# Patient Record
Sex: FEMALE | Race: WHITE | NOT HISPANIC OR LATINO | Employment: OTHER | URBAN - METROPOLITAN AREA
[De-identification: names, ages, dates, MRNs, and addresses within clinical notes are randomized per-mention and may not be internally consistent; named-entity substitution may affect disease eponyms.]

---

## 2023-09-12 ENCOUNTER — TELEPHONE (OUTPATIENT)
Dept: PRIMARY CARE | Facility: CLINIC | Age: 68
End: 2023-09-12
Payer: MEDICARE

## 2023-09-12 DIAGNOSIS — R92.8 ABNORMAL MAMMOGRAM: Primary | ICD-10-CM

## 2023-09-12 NOTE — TELEPHONE ENCOUNTER
Spoke with Pt  Relayed result and recommendation  Pt verbally understood  Faxing the orders to [facility] on [address].

## 2023-09-25 PROBLEM — Z00.00 ROUTINE ADULT HEALTH MAINTENANCE: Status: ACTIVE | Noted: 2023-09-25

## 2024-01-13 PROBLEM — R32 URINARY INCONTINENCE IN FEMALE: Status: ACTIVE | Noted: 2022-09-08

## 2024-01-13 PROBLEM — D72.819 LEUKOPENIA: Status: ACTIVE | Noted: 2024-01-13

## 2024-01-13 PROBLEM — K64.4 EXTERNAL HEMORRHOIDS: Status: ACTIVE | Noted: 2024-01-13

## 2024-01-13 PROBLEM — D64.9 ANEMIA: Status: ACTIVE | Noted: 2024-01-13

## 2024-01-13 PROBLEM — K76.89 LIVER CYST: Status: ACTIVE | Noted: 2024-01-13

## 2024-01-13 PROBLEM — R03.0 WHITE COAT SYNDROME WITHOUT DIAGNOSIS OF HYPERTENSION: Status: ACTIVE | Noted: 2024-01-13

## 2024-01-13 PROBLEM — E55.9 VITAMIN D DEFICIENCY: Status: ACTIVE | Noted: 2024-01-13

## 2024-01-13 PROBLEM — M16.11 ARTHRITIS OF RIGHT HIP: Status: ACTIVE | Noted: 2024-01-13

## 2024-01-13 PROBLEM — Z71.89 ADVANCED DIRECTIVES, COUNSELING/DISCUSSION: Status: ACTIVE | Noted: 2024-01-13

## 2024-01-13 PROBLEM — Z71.89 CARDIAC RISK COUNSELING: Status: ACTIVE | Noted: 2024-01-13

## 2024-01-13 PROBLEM — Z85.828 HISTORY OF NONMELANOMA SKIN CANCER: Status: ACTIVE | Noted: 2024-01-13

## 2024-01-13 PROBLEM — E80.4 GILBERT SYNDROME: Status: ACTIVE | Noted: 2024-01-13

## 2024-01-13 PROBLEM — M85.9 DISORDER OF BONE DENSITY AND STRUCTURE, UNSPECIFIED: Status: ACTIVE | Noted: 2024-01-13

## 2024-01-13 PROBLEM — Z86.2 HISTORY OF ANEMIA: Status: ACTIVE | Noted: 2024-01-13

## 2024-01-13 PROBLEM — R73.01 IMPAIRED FASTING GLUCOSE: Status: ACTIVE | Noted: 2024-01-13

## 2024-01-13 PROBLEM — N81.6 RECTOCELE, FEMALE: Status: ACTIVE | Noted: 2024-01-13

## 2024-01-13 PROBLEM — R76.8 POSITIVE ANTINUCLEAR ANTIBODY: Status: ACTIVE | Noted: 2024-01-13

## 2024-01-13 PROBLEM — Z13.89 SCREENING FOR MULTIPLE CONDITIONS: Status: ACTIVE | Noted: 2024-01-13

## 2024-01-13 PROBLEM — N95.2 POSTMENOPAUSAL ATROPHIC VAGINITIS: Status: ACTIVE | Noted: 2024-01-13

## 2024-01-13 PROBLEM — Z01.419 ENCOUNTER FOR GYNECOLOGICAL EXAMINATION WITHOUT ABNORMAL FINDING: Status: ACTIVE | Noted: 2024-01-13

## 2024-02-09 ENCOUNTER — LAB (OUTPATIENT)
Dept: LAB | Facility: LAB | Age: 69
End: 2024-02-09
Payer: MEDICARE

## 2024-02-09 DIAGNOSIS — D72.819 DECREASED WHITE BLOOD CELL COUNT, UNSPECIFIED: ICD-10-CM

## 2024-02-09 DIAGNOSIS — E80.4 GILBERT SYNDROME: ICD-10-CM

## 2024-02-09 DIAGNOSIS — E55.9 VITAMIN D DEFICIENCY, UNSPECIFIED: ICD-10-CM

## 2024-02-09 DIAGNOSIS — M85.9 DISORDER OF BONE DENSITY AND STRUCTURE, UNSPECIFIED: Primary | ICD-10-CM

## 2024-02-09 DIAGNOSIS — K76.89 OTHER SPECIFIED DISEASES OF LIVER: ICD-10-CM

## 2024-02-09 LAB
25(OH)D3 SERPL-MCNC: 54 NG/ML (ref 30–100)
ALBUMIN SERPL BCP-MCNC: 4.4 G/DL (ref 3.4–5)
ALP SERPL-CCNC: 58 U/L (ref 33–136)
ALT SERPL W P-5'-P-CCNC: 11 U/L (ref 7–45)
ANION GAP SERPL CALC-SCNC: 12 MMOL/L (ref 10–20)
APPEARANCE UR: ABNORMAL
AST SERPL W P-5'-P-CCNC: 12 U/L (ref 9–39)
BACTERIA #/AREA URNS AUTO: ABNORMAL /HPF
BASOPHILS # BLD AUTO: 0.03 X10*3/UL (ref 0–0.1)
BASOPHILS NFR BLD AUTO: 0.9 %
BILIRUB DIRECT SERPL-MCNC: 0.2 MG/DL (ref 0–0.3)
BILIRUB SERPL-MCNC: 0.9 MG/DL (ref 0–1.2)
BILIRUB UR STRIP.AUTO-MCNC: NEGATIVE MG/DL
BUN SERPL-MCNC: 12 MG/DL (ref 6–23)
CALCIUM SERPL-MCNC: 9.2 MG/DL (ref 8.6–10.3)
CHLORIDE SERPL-SCNC: 104 MMOL/L (ref 98–107)
CHOLEST SERPL-MCNC: 148 MG/DL (ref 0–199)
CHOLESTEROL/HDL RATIO: 2.5
CO2 SERPL-SCNC: 26 MMOL/L (ref 21–32)
COLOR UR: YELLOW
CREAT SERPL-MCNC: 0.69 MG/DL (ref 0.5–1.05)
EGFRCR SERPLBLD CKD-EPI 2021: >90 ML/MIN/1.73M*2
EOSINOPHIL # BLD AUTO: 0.05 X10*3/UL (ref 0–0.7)
EOSINOPHIL NFR BLD AUTO: 1.5 %
ERYTHROCYTE [DISTWIDTH] IN BLOOD BY AUTOMATED COUNT: 11.9 % (ref 11.5–14.5)
GLUCOSE SERPL-MCNC: 110 MG/DL (ref 74–99)
GLUCOSE UR STRIP.AUTO-MCNC: NEGATIVE MG/DL
HCT VFR BLD AUTO: 39.5 % (ref 36–46)
HDLC SERPL-MCNC: 59.1 MG/DL
HGB BLD-MCNC: 13.1 G/DL (ref 12–16)
IMM GRANULOCYTES # BLD AUTO: 0.01 X10*3/UL (ref 0–0.7)
IMM GRANULOCYTES NFR BLD AUTO: 0.3 % (ref 0–0.9)
KETONES UR STRIP.AUTO-MCNC: ABNORMAL MG/DL
LDLC SERPL CALC-MCNC: 76 MG/DL
LEUKOCYTE ESTERASE UR QL STRIP.AUTO: ABNORMAL
LYMPHOCYTES # BLD AUTO: 1.34 X10*3/UL (ref 1.2–4.8)
LYMPHOCYTES NFR BLD AUTO: 41.4 %
MCH RBC QN AUTO: 32.1 PG (ref 26–34)
MCHC RBC AUTO-ENTMCNC: 33.2 G/DL (ref 32–36)
MCV RBC AUTO: 97 FL (ref 80–100)
MONOCYTES # BLD AUTO: 0.36 X10*3/UL (ref 0.1–1)
MONOCYTES NFR BLD AUTO: 11.1 %
MUCOUS THREADS #/AREA URNS AUTO: ABNORMAL /LPF
NEUTROPHILS # BLD AUTO: 1.45 X10*3/UL (ref 1.2–7.7)
NEUTROPHILS NFR BLD AUTO: 44.8 %
NITRITE UR QL STRIP.AUTO: NEGATIVE
NON HDL CHOLESTEROL: 89 MG/DL (ref 0–149)
NRBC BLD-RTO: 0 /100 WBCS (ref 0–0)
PH UR STRIP.AUTO: 6 [PH]
PLATELET # BLD AUTO: 142 X10*3/UL (ref 150–450)
POTASSIUM SERPL-SCNC: 3.8 MMOL/L (ref 3.5–5.3)
PROT SERPL-MCNC: 6.4 G/DL (ref 6.4–8.2)
PROT UR STRIP.AUTO-MCNC: NEGATIVE MG/DL
RBC # BLD AUTO: 4.08 X10*6/UL (ref 4–5.2)
RBC # UR STRIP.AUTO: NEGATIVE /UL
RBC #/AREA URNS AUTO: ABNORMAL /HPF
SODIUM SERPL-SCNC: 138 MMOL/L (ref 136–145)
SP GR UR STRIP.AUTO: 1.02
SQUAMOUS #/AREA URNS AUTO: ABNORMAL /HPF
TRIGL SERPL-MCNC: 64 MG/DL (ref 0–149)
TSH SERPL-ACNC: 2.24 MIU/L (ref 0.44–3.98)
UROBILINOGEN UR STRIP.AUTO-MCNC: <2 MG/DL
VLDL: 13 MG/DL (ref 0–40)
WBC # BLD AUTO: 3.2 X10*3/UL (ref 4.4–11.3)
WBC #/AREA URNS AUTO: ABNORMAL /HPF

## 2024-02-09 PROCEDURE — 82306 VITAMIN D 25 HYDROXY: CPT

## 2024-02-09 PROCEDURE — 85025 COMPLETE CBC W/AUTO DIFF WBC: CPT

## 2024-02-09 PROCEDURE — 81001 URINALYSIS AUTO W/SCOPE: CPT

## 2024-02-09 PROCEDURE — 84443 ASSAY THYROID STIM HORMONE: CPT | Mod: WAIVER OF LIABILITY ON FILE

## 2024-02-09 PROCEDURE — 80061 LIPID PANEL: CPT

## 2024-02-09 PROCEDURE — 80053 COMPREHEN METABOLIC PANEL: CPT

## 2024-02-09 PROCEDURE — 36415 COLL VENOUS BLD VENIPUNCTURE: CPT

## 2024-02-09 PROCEDURE — 82248 BILIRUBIN DIRECT: CPT

## 2024-02-19 ENCOUNTER — OFFICE VISIT (OUTPATIENT)
Dept: PRIMARY CARE | Facility: CLINIC | Age: 69
End: 2024-02-19
Payer: MEDICARE

## 2024-02-19 VITALS
HEIGHT: 65 IN | DIASTOLIC BLOOD PRESSURE: 60 MMHG | BODY MASS INDEX: 25.99 KG/M2 | SYSTOLIC BLOOD PRESSURE: 110 MMHG | OXYGEN SATURATION: 97 % | TEMPERATURE: 98.6 F | HEART RATE: 89 BPM | WEIGHT: 156 LBS

## 2024-02-19 DIAGNOSIS — D69.6 THROMBOCYTOPENIA (CMS-HCC): ICD-10-CM

## 2024-02-19 DIAGNOSIS — R73.01 IMPAIRED FASTING GLUCOSE: ICD-10-CM

## 2024-02-19 DIAGNOSIS — Z12.31 ENCOUNTER FOR SCREENING MAMMOGRAM FOR BREAST CANCER: Primary | ICD-10-CM

## 2024-02-19 DIAGNOSIS — Z01.419 ENCOUNTER FOR GYNECOLOGICAL EXAMINATION WITHOUT ABNORMAL FINDING: ICD-10-CM

## 2024-02-19 DIAGNOSIS — R73.9 HYPERGLYCEMIA: ICD-10-CM

## 2024-02-19 DIAGNOSIS — Z78.0 MENOPAUSE: ICD-10-CM

## 2024-02-19 DIAGNOSIS — E55.9 VITAMIN D DEFICIENCY: ICD-10-CM

## 2024-02-19 DIAGNOSIS — Z13.6 SCREENING FOR CARDIOVASCULAR CONDITION: ICD-10-CM

## 2024-02-19 DIAGNOSIS — D72.819 LEUKOPENIA, UNSPECIFIED TYPE: ICD-10-CM

## 2024-02-19 DIAGNOSIS — Z71.89 CARDIAC RISK COUNSELING: ICD-10-CM

## 2024-02-19 DIAGNOSIS — Z71.89 ADVANCED DIRECTIVES, COUNSELING/DISCUSSION: ICD-10-CM

## 2024-02-19 DIAGNOSIS — K64.4 EXTERNAL HEMORRHOIDS: ICD-10-CM

## 2024-02-19 DIAGNOSIS — M85.9 DISORDER OF BONE DENSITY AND STRUCTURE, UNSPECIFIED: ICD-10-CM

## 2024-02-19 DIAGNOSIS — Z13.820 SCREENING FOR OSTEOPOROSIS: ICD-10-CM

## 2024-02-19 DIAGNOSIS — N81.6 RECTOCELE, FEMALE: ICD-10-CM

## 2024-02-19 DIAGNOSIS — Z13.89 SCREENING FOR MULTIPLE CONDITIONS: ICD-10-CM

## 2024-02-19 DIAGNOSIS — Z00.00 ROUTINE ADULT HEALTH MAINTENANCE: ICD-10-CM

## 2024-02-19 PROBLEM — D64.9 ANEMIA: Status: RESOLVED | Noted: 2024-01-13 | Resolved: 2024-02-19

## 2024-02-19 PROBLEM — R32 URINARY INCONTINENCE IN FEMALE: Status: RESOLVED | Noted: 2022-09-08 | Resolved: 2024-02-19

## 2024-02-19 PROCEDURE — 3078F DIAST BP <80 MM HG: CPT | Performed by: INTERNAL MEDICINE

## 2024-02-19 PROCEDURE — 3074F SYST BP LT 130 MM HG: CPT | Performed by: INTERNAL MEDICINE

## 2024-02-19 PROCEDURE — 1158F ADVNC CARE PLAN TLK DOCD: CPT | Performed by: INTERNAL MEDICINE

## 2024-02-19 PROCEDURE — 1036F TOBACCO NON-USER: CPT | Performed by: INTERNAL MEDICINE

## 2024-02-19 PROCEDURE — G0444 DEPRESSION SCREEN ANNUAL: HCPCS | Performed by: INTERNAL MEDICINE

## 2024-02-19 PROCEDURE — 1160F RVW MEDS BY RX/DR IN RCRD: CPT | Performed by: INTERNAL MEDICINE

## 2024-02-19 PROCEDURE — G0101 CA SCREEN;PELVIC/BREAST EXAM: HCPCS | Performed by: INTERNAL MEDICINE

## 2024-02-19 PROCEDURE — 1123F ACP DISCUSS/DSCN MKR DOCD: CPT | Performed by: INTERNAL MEDICINE

## 2024-02-19 PROCEDURE — 1126F AMNT PAIN NOTED NONE PRSNT: CPT | Performed by: INTERNAL MEDICINE

## 2024-02-19 PROCEDURE — G0446 INTENS BEHAVE THER CARDIO DX: HCPCS | Performed by: INTERNAL MEDICINE

## 2024-02-19 PROCEDURE — G0439 PPPS, SUBSEQ VISIT: HCPCS | Performed by: INTERNAL MEDICINE

## 2024-02-19 PROCEDURE — 99497 ADVNCD CARE PLAN 30 MIN: CPT | Performed by: INTERNAL MEDICINE

## 2024-02-19 PROCEDURE — 1159F MED LIST DOCD IN RCRD: CPT | Performed by: INTERNAL MEDICINE

## 2024-02-19 PROCEDURE — 99214 OFFICE O/P EST MOD 30 MIN: CPT | Performed by: INTERNAL MEDICINE

## 2024-02-19 RX ORDER — HYDROCORTISONE 25 MG/G
CREAM TOPICAL 4 TIMES DAILY PRN
Qty: 30 G | Refills: 5 | Status: SHIPPED | OUTPATIENT
Start: 2024-02-19 | End: 2025-02-18

## 2024-02-19 RX ORDER — CEPHRADINE 500 MG
1 CAPSULE ORAL DAILY
COMMUNITY
Start: 2021-06-14

## 2024-02-19 RX ORDER — LANOLIN ALCOHOL/MO/W.PET/CERES
1000 CREAM (GRAM) TOPICAL DAILY
COMMUNITY
Start: 2019-05-16

## 2024-02-19 RX ORDER — HYDROCORTISONE ACETATE 25 MG/1
25 SUPPOSITORY RECTAL 2 TIMES DAILY PRN
Qty: 30 SUPPOSITORY | Refills: 1 | Status: SHIPPED | OUTPATIENT
Start: 2024-02-19 | End: 2025-02-18

## 2024-02-19 RX ORDER — BIMATOPROST 3 UG/ML
SOLUTION TOPICAL NIGHTLY
COMMUNITY
Start: 2023-10-31

## 2024-02-19 RX ORDER — ACETAMINOPHEN 500 MG
5000 TABLET ORAL DAILY
COMMUNITY
Start: 2011-03-30

## 2024-02-19 ASSESSMENT — PATIENT HEALTH QUESTIONNAIRE - PHQ9
2. FEELING DOWN, DEPRESSED OR HOPELESS: NOT AT ALL
SUM OF ALL RESPONSES TO PHQ9 QUESTIONS 1 AND 2: 0
1. LITTLE INTEREST OR PLEASURE IN DOING THINGS: NOT AT ALL

## 2024-02-19 NOTE — ASSESSMENT & PLAN NOTE
Annual Wellness exam completed   Preventive Health History reviewed  Ordered:   Labs    Mammogram   BMD

## 2024-02-19 NOTE — PROGRESS NOTES
Medicare Wellness Exam/Comprehensive Problem Focused Follow Up and Physical Exam  Chief Complaint   Patient presents with    Medicare Annual Wellness Visit Subsequent     Mammogram?  Hemorrhoids are out of control  Labs?     HPI: Saw UroGyn in Fall (Copied):  Provider Impressions  Assessment:   68 year old female with hx of urethral meatus pain, stage 2 rectocele, and UUI. Comorbidities include: GERD, Gilbert syndrome, and white coat syndrome HTN.   Diagnoses:   #1 Urge incontinence   #2 Rectocele, stage 2  #3 Urethral meatus pain (resolved)  #4 Abdominal pain, abdominal muscle strain  Plan:   1. Rectocele, stage 2  - Her POP sx have significant improved since going to PFPT.   - Encouraged her to continue HEP.   2. UUI  - Her UUI has significantly improved with going to PFPT.   3. Urethral meatus pain  - Discontinued using tv estrogen cream as her urethral pain resolved.   4. Abdominal pain, R side ddx: abdominal muscle strain  - Her sx include right sided abdominal pain that does not radiate that is improved in the mornings/while sleeping and progressively worsens throughout the day. Her pain is described as cramping.   - Upon exam she had an area of tenderness was periumbilical on R side radiating to her R flank.  - Counseled that her symptoms and exam are consistent with musculoskeletal pain where the pain will increase with activity and resolve with relaxation/stationary.   - Discussed that there is no screening tool for ovarian cancer but there is no reason to believe that this is a symptom of ovarian cancer however we are happy to do a pelvic exam today to provide her with further reassurance.   - Advised her to try OTC pain relief measures including heat therapy, Ibuprofen, and Tylenol and if this improves her pain that it is even more suggestive of muscular pain.   Follow-up in 1 year with PETTY Warner.     Still has the prolapse  Did the PFPT which helped the urinary sx  Her hemorrhoids are  the biggest issue right now  Worse when goes to the bathroom  No constipation  + Bleeding with Bms  Using prep H  Cscope 2020: - Repeat colonoscopy in 7 years for surveillance     BP at home 110/60  Hoje cuff accurate    Labs reviewed:  Component      Latest Ref Rng 2/9/2024   WBC      4.4 - 11.3 x10*3/uL 3.2 (L)    nRBC      0.0 - 0.0 /100 WBCs 0.0    RBC      4.00 - 5.20 x10*6/uL 4.08    HEMOGLOBIN      12.0 - 16.0 g/dL 13.1    HEMATOCRIT      36.0 - 46.0 % 39.5    MCV      80 - 100 fL 97    MCH      26.0 - 34.0 pg 32.1    MCHC      32.0 - 36.0 g/dL 33.2    RED CELL DISTRIBUTION WIDTH      11.5 - 14.5 % 11.9    Platelets      150 - 450 x10*3/uL 142 (L)    Neutrophils %      40.0 - 80.0 % 44.8    Immature Granulocytes %, Automated      0.0 - 0.9 % 0.3    Lymphocytes %      13.0 - 44.0 % 41.4    Monocytes %      2.0 - 10.0 % 11.1    Eosinophils %      0.0 - 6.0 % 1.5    Basophils %      0.0 - 2.0 % 0.9    Neutrophils Absolute      1.20 - 7.70 x10*3/uL 1.45    Immature Granulocytes Absolute, Automated      0.00 - 0.70 x10*3/uL 0.01    Lymphocytes Absolute      1.20 - 4.80 x10*3/uL 1.34    Monocytes Absolute      0.10 - 1.00 x10*3/uL 0.36    Eosinophils Absolute      0.00 - 0.70 x10*3/uL 0.05    Basophils Absolute      0.00 - 0.10 x10*3/uL 0.03    GLUCOSE      74 - 99 mg/dL 110 (H)    SODIUM      136 - 145 mmol/L 138    POTASSIUM      3.5 - 5.3 mmol/L 3.8    CHLORIDE      98 - 107 mmol/L 104    Bicarbonate      21 - 32 mmol/L 26    Anion Gap      10 - 20 mmol/L 12    Blood Urea Nitrogen      6 - 23 mg/dL 12    Creatinine      0.50 - 1.05 mg/dL 0.69    EGFR      >60 mL/min/1.73m*2 >90    Calcium      8.6 - 10.3 mg/dL 9.2    Albumin      3.4 - 5.0 g/dL 4.4    Alkaline Phosphatase      33 - 136 U/L 58    Total Protein      6.4 - 8.2 g/dL 6.4    AST      9 - 39 U/L 12    Bilirubin Total      0.0 - 1.2 mg/dL 0.9    ALT      7 - 45 U/L 11    Color, Urine      Straw, Yellow  Yellow    Appearance, Urine      Clear  Hazy ! (N)     Specific Gravity, Urine      1.005 - 1.035  1.019    pH, Urine      5.0, 5.5, 6.0, 6.5, 7.0, 7.5, 8.0  6.0    Protein, Urine      NEGATIVE mg/dL NEGATIVE    Glucose, Urine      NEGATIVE mg/dL NEGATIVE    Blood, Urine      NEGATIVE  NEGATIVE    Ketones, Urine      NEGATIVE mg/dL 5 (TRACE) !    Bilirubin, Urine      NEGATIVE  NEGATIVE    Urobilinogen, Urine      <2.0 mg/dL <2.0    Nitrite, Urine      NEGATIVE  NEGATIVE    Leukocyte Esterase, Urine      NEGATIVE  MODERATE (2+) !    CHOLESTEROL      0 - 199 mg/dL 148    HDL CHOLESTEROL      mg/dL 59.1    Cholesterol/HDL Ratio 2.5    LDL Calculated      <=99 mg/dL 76    VLDL      0 - 40 mg/dL 13    TRIGLYCERIDES      0 - 149 mg/dL 64    Non HDL Cholesterol      0 - 149 mg/dL 89    WBC, Urine      1-5, NONE /HPF 1-5    RBC, Urine      NONE, 1-2, 3-5 /HPF 1-2    Squamous Epithelial Cells, Urine      Reference range not established. /HPF 10-25 (FEW)    Bacteria, Urine      NONE SEEN /HPF 1+ !    Mucus, Urine      Reference range not established. /LPF 2+    Bilirubin, Direct      0.0 - 0.3 mg/dL 0.2    Thyroid Stimulating Hormone      0.44 - 3.98 mIU/L 2.24    Vitamin D, 25-Hydroxy, Total      30 - 100 ng/mL 54       Had done 5 days fasting in past 3 months for diet  Eats little meat      Assessment and Plan:  Problem List Items Addressed This Visit          High    Routine adult health maintenance    Overview         Influenza vaccine 10/1/21, 9/6/22, 10/11/23      RSV declined      PPSV 1/14/22      PCV 20 2/2/23      Tdap (Age 7+)4/29/2011      Td 1/7/20      Moderna Covid 19 vaccine 3/5/21, 4/2/21, 11/12/21, 4/26/22, 11/7/22, 10/11/23      Shingrix 1/11/21, 6/14/21      Cscope 2014 (7yrs); 7/1/20 (7yrs)      Mamm 3/26/19; 6/24/2020, 9/13/21, 9/27/22      BMD 12/31/13, 6/24/20; 9/19/22      Hep C Ab (-) 3/7/18      s/p [facility]      BP cuff calibration 1/20: 130/81 ours, 132/80 hers      4/19/21: US aorta: normal      2/2/23: Current 10-Year ASCVD Risk: 8.19% - Intermediate  Risk         Current Assessment & Plan     Annual Wellness exam completed   Preventive Health History reviewed  Ordered:   Labs    Mammogram   BMD          Relevant Orders    Lipid Panel Non-Fasting       Medium    Advanced directives, counseling/discussion    Overview     2/19/24 HCPOA: oBo Guillen ()   FULL CODE  Has particular instructions for vegetative state in her LW         Cardiac risk counseling    Overview     2/2/23: Current 10-Year ASCVD Risk: 8.3% - Intermediate Risk   no ASA rec'd per current guidelines         Disorder of bone density and structure, unspecified    Overview     Comment on above: Low FRAX scoresBMD every 2 years;         Relevant Orders    Comprehensive Metabolic Panel    CBC and Auto Differential    Encounter for gynecological examination without abnormal finding    Encounter for screening mammogram for breast cancer - Primary    Relevant Orders    BI mammo bilateral screening tomosynthesis (Completed)    External hemorrhoids    Overview     Will get further eval         Relevant Medications    hydrocortisone (Anusol-HC) 2.5 % rectal cream    hydrocortisone (Anusol-HC) 25 mg suppository    Other Relevant Orders    Referral to Colorectal Surgery    Impaired fasting glucose    Overview     Comment on above: Diet controlled.Disvussed decreasing sugar intake;         Relevant Orders    Urinalysis with Reflex Culture and Microscopic    Hemoglobin A1c    CT cardiac scoring wo IV contrast    Leukopenia    Overview     Amari was 3.1 in 1/21  Monitor         Relevant Orders    CBC and Auto Differential    Menopause    Relevant Orders    XR DEXA bone density    Rectocele, female    Overview     Has seen GYN         Current Assessment & Plan     Will see [facility] regarding her hemorrhoids  For now will continue PFPT prn         Screening for multiple conditions    Overview     Depression screening completed using the PHQ-2 questions with results documented in the chart/encounter  (See  "Rooming Screenings for documentation)           Screening for osteoporosis    Relevant Orders    XR DEXA bone density    Thrombocytopenia (CMS/HCC)    Overview     ? Etiology vs pseudothrombocytopenia  Will repeat         Relevant Orders    CBC and Auto Differential    Vitamin D deficiency    Overview     Comment on above: Goal 40-50;         Relevant Orders    Vitamin D 25-Hydroxy,Total (for eval of Vitamin D levels)     Other Visit Diagnoses       Hyperglycemia        check Hba1c    Relevant Orders    Glucose, fasting    CT cardiac scoring wo IV contrast    Screening for cardiovascular condition        Relevant Orders    CT cardiac scoring wo IV contrast          ROS otherwise negative aside from what was mentioned above in HPI.    Vitals  /60 Comment: accurate home cuff  Pulse 89   Temp 37 °C (98.6 °F)   Ht 1.657 m (5' 5.25\")   Wt 70.8 kg (156 lb)   SpO2 97%   BMI 25.76 kg/m²   Body mass index is 25.76 kg/m².  Physical Exam  Gen: Alert, NAD  HEENT:  Unremarkable  Neck:  No ENA  Respiratory:  Lungs CTAB  Cardiovascular:  Heart RRR  Neuro:  Gross motor and sensory intact  Skin:  No suspicious lesions present  Breast: No masses, or axillary lymphadenopathy  Gyn: Normal pelvic exam: no uterine masses or cervical lesions, or CMT; no vaginal D/C. No ovarian or adnexal masses; No external vaginal or anal/perineal lesions (Pt declined chaperone)  Rectal: + hemorroidal tags seen extermally, one suspected internal hemorrhoid palpated  + rectocoele    Patient Care Team:  Oneyda Sims MD as PCP - General  Oneyda Sims MD as PCP - [facility] Attributed Provider  Monika Domínguez MD as Consulting Physician (Dermatology)       Activities of Daily Living  In your present state of health, do you have any difficulty performing the following activities?:   Preparing food and eating?: No  Bathing yourself: No  Getting dressed: No  Using the toilet:No  Moving around from place to place: No  In the past year have you " fallen or had a near fall?:No  Able to manage finances independently: Yes  Able to perform grocery shopping: Yes  Able to manage medications independently: Yes  Able to do housework independently: Yes  Patient self-assessment of health status? Good    Current exercise habits: swim, walk and do some strength training   Dietary issues discussed: Yes  Hearing difficulties: No  Safe in current home environment: Yes  Visual Acuity assessed: No  Cognitive Impairment No  Allergies and Medications  Patient has no known allergies.  Current Outpatient Medications   Medication Instructions    bimatoprost (Latisse) 0.03 % ophthalmic solution Both Eyes, Nightly    cholecalciferol (VITAMIN D-3) 5,000 Units, oral, Daily    cyanocobalamin (VITAMIN B-12) 1,000 mcg, oral, Daily    FA/niacinamide/cupric ox/Zn ox (TL NICOTINAMIDE ORAL) 1 tablet, oral, Daily    hydrocortisone (Anusol-HC) 2.5 % rectal cream rectal, 4 times daily PRN    hydrocortisone (ANUSOL-HC) 25 mg, rectal, 2 times daily PRN    vitamin D3-vitamin K2, MK4, (K2 Plus D3) 1,000-100 unit-mcg tablet 1 tablet, oral, Daily       Medications and Supplements  prescribed by me and other practitioners or clinical pharmacist (such as prescriptions, OTC's, herbal therapies and supplements) were reviewed and documented in the medical record.      Active Problem List  Patient Active Problem List   Diagnosis    Routine adult health maintenance    Advanced directives, counseling/discussion    Cardiac risk counseling    History of nonmelanoma skin cancer    Screening for multiple conditions    Encounter for gynecological examination without abnormal finding    Arthritis of right hip    Disorder of bone density and structure, unspecified    External hemorrhoids    Gastroesophageal reflux disease    Gilbert syndrome    Liver cyst    History of anemia    Impaired fasting glucose    Leukopenia    Positive antinuclear antibody    Rectocele, female    Postmenopausal atrophic vaginitis     Vitamin D deficiency    White coat syndrome without diagnosis of hypertension    BMI 25.0-25.9,adult    Thrombocytopenia (CMS/HCC)    Screening for osteoporosis    Menopause    Encounter for screening mammogram for breast cancer       Comprehensive Medical/Surgical/Social/Family History  Past Medical History:   Diagnosis Date    H/O abdominal ultrasound     2018: HEPATIC CYSTS, THE ONE IN THE LEFT HEPATIC LOBE IS MILDLY COMPLEX.     Anechoic cyst in the left hepatic lobe measures 1.3 x 0.9      x 0.8 cm and contains a thin internal septation. Additional anechoic      cyst in the right lobe measures 4 x 6 x 6 mm    H/O bone density study     9/19/22: Ten Year Major Osteoporotic Fracture Risk: 8.67%      Ten Year Hip Fracture Risk: 0.91%      T-Score: -1.5      2013: -1.1, FRAX 5.2/0.3%; -1.2     6/20: Ten Year Major Osteoporotic Fracture Risk: 9.39%      Ten Year Hip Fracture Risk: 1.05%      T-Score: -1.7    H/O chest x-ray     1/21: BONES:     Lower thoracic mild dextrocurvature may be partially exaggerated by     positioning. Mild multilevel anterior endplate spurring present in     the spine.          IMPRESSION:     1. Hyperinflation. Mild diffuse interstitial prominence which may be chronic. No localized     consolidation.    H/O diagnostic ultrasound 09/23/2023    R Breast US Benign fibrocystic changes in upper outer quadrant. No evidence of malignancy. 1 yr screening rec'd    H/O Doppler ultrasound     4/21: US aorta (-)    H/O magnetic resonance imaging     6/2019: MRI HIP: MILD RIGHT HIP OSTEOARTHRITIS.     BILATERAL GREATER TROCHANTERIC BURSITIS, RIGHT GREATER THAN LEFT.     CALCIFIC TENDINOSIS LEFT HAMSTRING ORIGIN.      Right hip: Increased signal within the anterior superior labrum with          small tear and small subchondral cystic changes in the superior      acetabulum. Small areas of partial-thickness cartilage loss.    H/O x-ray of lumbar spine     2011: DEGENERATIVE DISK DISEASE    History of  x-ray of hip     : RESULT:          Right hip joint is maintained. Mild hypertrophic changes at the greater      trochanter. Left hip joint, sacroiliac joints and pubic symphysis are      also maintained. Calcified deposit at left hamstring origin has the      appearance of calcific tendinitis. No acute fracture or dislocation.      There are no bony erosions.     Past Surgical History:   Procedure Laterality Date    COLONOSCOPY      2014: normal     2020: normal    LARYNGOSCOPY      : erythema of the posterior glottis c/w GERD    OTHER SURGICAL HISTORY  2021    Skin biopsy    OTHER SURGICAL HISTORY  2021    Mohs surgery    OTHER SURGICAL HISTORY  05/15/2019    Dilation and curettage    TOTAL ABDOMINAL HYSTERECTOMY      , ovaries remain     Social History     Social History Narrative    Family History        M: Diabetes, CAD/MI age 70s, HTN. ( age 81)        F: CAD age 70s/CABG/CHF, AAA age 70s, colon polyps ( 89)        S: OP                         S:         Social History       ·      ·  (Boo Guillen)        3 kids Retired Manager at [employer]        (former  Center [employer])     · Non-smoker      · Social alcohol use     Tobacco/Alcohol/Opioid use, as well as Illicit Drug Use was screened for/reviewed and documented in Social Documentation section of the chart and medication list as appropriate    Depression Screening  Depression screening (15m) completed using the PHQ-2 questions with results documented in the chart/encounter  (Rooming Screening section for documentation, or note for additional information)    Cardiac Risk Assessment  Cardiovascular risk was discussed and, if needed, lifestyle modifications recommended, including nutritional choices, exercise, and elimination of habits contributing to risk.   We agreed on a plan to reduce the current cardiovascular risk based on above discussion as needed.     Aspirin use/disuse was discussed  and documented in the Problem List of the medical record (under Cardiac Risk Counseling) after reviewing the updated guidelines below:  Consider low dose Aspirin ( mg) use if the benefit for cardiovascular disease prevention outweighs risk for bleeding complications.   In general, low dose ASA should be considered:  In patients WITHOUT prior MI/stroke/PAD (primary prevention):   a. Age <60: Use if 10-year cardiovascular disease risk >20%, with discussion of risks and benefits with patient  b. Age 60-<70: Use if 10-year cardiovascular disease risk >20% and low bleeding (e.g., gastrointestinal) risk, with discussion of risks and benefits with patient  c. Age >=70: Do not use    In patients WITH prior MI/stroke/PAD (secondary prevention):   Generally use unless extremely high bleeding (e.g., gastrointenstinal) risk, with discussion of risks and benefits with patient    Advance Directives Discussion  Advanced Care Planning (including a Living Will, Healthcare POA, as well as specific end of life choices and/or directives), was discussed with the patient and/or surrogate, voluntarily, and details of that discussion documented in the Problem List (under Advanced Directives Discussion) of the medical record.    (~16 min spent discussing above)     During the course of the visit the patient was educated and counseled about age appropriate screening and preventive services. Completed preventive screenings were documented in the chart and orders were placed for outstanding screenings/procedures as documented in the Assessment and Plan.    Patient Instructions (the written plan) was given to the patient at check out.

## 2024-02-20 ENCOUNTER — LAB (OUTPATIENT)
Dept: LAB | Facility: LAB | Age: 69
End: 2024-02-20
Payer: MEDICARE

## 2024-02-20 DIAGNOSIS — D69.6 THROMBOCYTOPENIA (CMS-HCC): ICD-10-CM

## 2024-02-20 DIAGNOSIS — D72.819 LEUKOPENIA, UNSPECIFIED TYPE: ICD-10-CM

## 2024-02-20 DIAGNOSIS — R73.01 IMPAIRED FASTING GLUCOSE: ICD-10-CM

## 2024-02-20 DIAGNOSIS — R73.9 HYPERGLYCEMIA: ICD-10-CM

## 2024-02-20 LAB
BASOPHILS # BLD AUTO: 0.03 X10*3/UL (ref 0–0.1)
BASOPHILS NFR BLD AUTO: 0.9 %
EOSINOPHIL # BLD AUTO: 0.03 X10*3/UL (ref 0–0.7)
EOSINOPHIL NFR BLD AUTO: 0.9 %
ERYTHROCYTE [DISTWIDTH] IN BLOOD BY AUTOMATED COUNT: 11.9 % (ref 11.5–14.5)
EST. AVERAGE GLUCOSE BLD GHB EST-MCNC: 111 MG/DL
GLUCOSE P FAST SERPL-MCNC: 97 MG/DL (ref 74–99)
HBA1C MFR BLD: 5.5 %
HCT VFR BLD AUTO: 40.2 % (ref 36–46)
HGB BLD-MCNC: 13.6 G/DL (ref 12–16)
IMM GRANULOCYTES # BLD AUTO: 0.01 X10*3/UL (ref 0–0.7)
IMM GRANULOCYTES NFR BLD AUTO: 0.3 % (ref 0–0.9)
LYMPHOCYTES # BLD AUTO: 1.33 X10*3/UL (ref 1.2–4.8)
LYMPHOCYTES NFR BLD AUTO: 40.5 %
MCH RBC QN AUTO: 32.2 PG (ref 26–34)
MCHC RBC AUTO-ENTMCNC: 33.8 G/DL (ref 32–36)
MCV RBC AUTO: 95 FL (ref 80–100)
MONOCYTES # BLD AUTO: 0.33 X10*3/UL (ref 0.1–1)
MONOCYTES NFR BLD AUTO: 10.1 %
NEUTROPHILS # BLD AUTO: 1.55 X10*3/UL (ref 1.2–7.7)
NEUTROPHILS NFR BLD AUTO: 47.3 %
NRBC BLD-RTO: 0 /100 WBCS (ref 0–0)
PLATELET # BLD AUTO: 155 X10*3/UL (ref 150–450)
RBC # BLD AUTO: 4.23 X10*6/UL (ref 4–5.2)
WBC # BLD AUTO: 3.3 X10*3/UL (ref 4.4–11.3)

## 2024-02-20 PROCEDURE — 85025 COMPLETE CBC W/AUTO DIFF WBC: CPT

## 2024-02-20 PROCEDURE — 83036 HEMOGLOBIN GLYCOSYLATED A1C: CPT

## 2024-02-20 PROCEDURE — 82947 ASSAY GLUCOSE BLOOD QUANT: CPT

## 2024-02-20 PROCEDURE — 36415 COLL VENOUS BLD VENIPUNCTURE: CPT

## 2024-02-29 ENCOUNTER — OFFICE VISIT (OUTPATIENT)
Dept: SURGERY | Facility: CLINIC | Age: 69
End: 2024-02-29
Payer: MEDICARE

## 2024-02-29 VITALS
SYSTOLIC BLOOD PRESSURE: 169 MMHG | TEMPERATURE: 98.4 F | HEART RATE: 89 BPM | WEIGHT: 160 LBS | DIASTOLIC BLOOD PRESSURE: 69 MMHG | HEIGHT: 65 IN | BODY MASS INDEX: 26.66 KG/M2

## 2024-02-29 DIAGNOSIS — K64.4 EXTERNAL HEMORRHOIDS: ICD-10-CM

## 2024-02-29 DIAGNOSIS — K64.8 PROLAPSED INTERNAL HEMORRHOIDS: ICD-10-CM

## 2024-02-29 PROCEDURE — 1036F TOBACCO NON-USER: CPT | Performed by: NURSE PRACTITIONER

## 2024-02-29 PROCEDURE — 1159F MED LIST DOCD IN RCRD: CPT | Performed by: NURSE PRACTITIONER

## 2024-02-29 PROCEDURE — 1160F RVW MEDS BY RX/DR IN RCRD: CPT | Performed by: NURSE PRACTITIONER

## 2024-02-29 PROCEDURE — 1123F ACP DISCUSS/DSCN MKR DOCD: CPT | Performed by: NURSE PRACTITIONER

## 2024-02-29 PROCEDURE — 3078F DIAST BP <80 MM HG: CPT | Performed by: NURSE PRACTITIONER

## 2024-02-29 PROCEDURE — 99213 OFFICE O/P EST LOW 20 MIN: CPT | Performed by: NURSE PRACTITIONER

## 2024-02-29 PROCEDURE — 1126F AMNT PAIN NOTED NONE PRSNT: CPT | Performed by: NURSE PRACTITIONER

## 2024-02-29 PROCEDURE — 46600 DIAGNOSTIC ANOSCOPY SPX: CPT | Performed by: NURSE PRACTITIONER

## 2024-02-29 PROCEDURE — 46221 LIGATION OF HEMORRHOID(S): CPT | Performed by: NURSE PRACTITIONER

## 2024-02-29 PROCEDURE — 3077F SYST BP >= 140 MM HG: CPT | Performed by: NURSE PRACTITIONER

## 2024-02-29 NOTE — PROGRESS NOTES
Subjective   Patient ID: Lila Grewal is a 69 y.o. female who presents today with hemorrhoidal issues.    HPI  She had hemorrhoids since the birth of her children, but they never bothered her that much.  She will have a little rectal bleeding at times on the tissue only.  She can feel a hemorrhoid come out and go back in by itself.   She has rare constipation and will have a BM 1-2x/day.  No c/o any straining.  She usually does not sit long on the toilet to have a BM.  She does not take any fiber supplements or Colace.  No c/o any accidents or leakage of stool.      She has a hx of a rectocele and a small bladder prolapse.  She has had 3 vaginal births with an episiotomy.      Colonoscopy 2023 negative.    Review of Systems   All other systems reviewed and are negative.      Objective   Physical Exam  Constitutional:       Appearance: Normal appearance.   HENT:      Head: Normocephalic and atraumatic.   Pulmonary:      Effort: Pulmonary effort is normal.   Musculoskeletal:         General: Normal range of motion.   Skin:     General: Skin is warm and dry.   Neurological:      General: No focal deficit present.      Mental Status: She is alert and oriented to person, place, and time.   Psychiatric:         Mood and Affect: Mood normal.         Behavior: Behavior normal.         Anoscopy    Date/Time: 2/29/2024 12:42 PM    Performed by: PETTY Ho  Authorized by: PETTY Ho    Consent:     Consent obtained:  Verbal    Consent given by:  Patient    Risks, benefits, and alternatives were discussed: yes    Universal protocol:     Procedure explained and questions answered to patient or proxy's satisfaction: yes      Patient identity confirmed:  Verbally with patient  Post-procedure details:     Procedure completion:  Tolerated  Comments:      No external hemorrhoids.  Good tone on KODY.  On anoscopy, she has moderate prolapsing internal hemorrhoids with more of the prolapse on the right  lateral aspect.    Rubber band ligation    Date/Time: 2/29/2024 12:43 PM    Performed by: PETTY Ho  Authorized by: PETTY Ho    Consent:     Consent obtained:  Verbal    Consent given by:  Patient    Risks, benefits, and alternatives were discussed: yes      Risks discussed:  Bleeding, infection and pain  Procedure specific details:      On anoscopy, I was able to place rubber bands over the hemorrhoid in the right lateral position.  Post-procedure details:     Procedure completion:  Tolerated    Assessment/Plan   Lila tolerated the rubber band ligation well today.  I was able to place rubber bands over the hemorrhoid in the right lateral aspect.  She will start taking Benefiber daily and increase her fiber and water intake.  She will call with any issues and will follow up in 6 weeks.      PETTY Ho 02/29/24 9:20 AM

## 2024-03-14 ENCOUNTER — OFFICE VISIT (OUTPATIENT)
Dept: SURGERY | Facility: CLINIC | Age: 69
End: 2024-03-14
Payer: MEDICARE

## 2024-03-14 VITALS
DIASTOLIC BLOOD PRESSURE: 77 MMHG | WEIGHT: 160 LBS | SYSTOLIC BLOOD PRESSURE: 161 MMHG | HEIGHT: 65 IN | HEART RATE: 81 BPM | TEMPERATURE: 97.9 F | BODY MASS INDEX: 26.66 KG/M2

## 2024-03-14 DIAGNOSIS — K64.8 BLEEDING INTERNAL HEMORRHOIDS: Primary | ICD-10-CM

## 2024-03-14 PROCEDURE — 3078F DIAST BP <80 MM HG: CPT | Performed by: NURSE PRACTITIONER

## 2024-03-14 PROCEDURE — 99213 OFFICE O/P EST LOW 20 MIN: CPT | Performed by: NURSE PRACTITIONER

## 2024-03-14 PROCEDURE — 1159F MED LIST DOCD IN RCRD: CPT | Performed by: NURSE PRACTITIONER

## 2024-03-14 PROCEDURE — 1036F TOBACCO NON-USER: CPT | Performed by: NURSE PRACTITIONER

## 2024-03-14 PROCEDURE — 46600 DIAGNOSTIC ANOSCOPY SPX: CPT | Performed by: NURSE PRACTITIONER

## 2024-03-14 PROCEDURE — 3077F SYST BP >= 140 MM HG: CPT | Performed by: NURSE PRACTITIONER

## 2024-03-14 PROCEDURE — 1160F RVW MEDS BY RX/DR IN RCRD: CPT | Performed by: NURSE PRACTITIONER

## 2024-03-14 PROCEDURE — 1123F ACP DISCUSS/DSCN MKR DOCD: CPT | Performed by: NURSE PRACTITIONER

## 2024-03-14 RX ORDER — WHEAT DEXTRIN 3 G/3.5 G
POWDER IN PACKET (EA) ORAL
COMMUNITY

## 2024-03-14 NOTE — PROGRESS NOTES
Subjective   Patient ID: Lila Grewal is a 69 y.o. female who is s/p rubber band ligation ON 2/29/24.    HPI  She is still having anal pain with her BM's. It is like a dull ache and it can last a few hours after wards (rates it at a 2). She is still taking the Benefiber daily and her stools are soft.   The rectal bleeding is gone.  She is having 2-3 soft BM's daily.      Review of Systems   All other systems reviewed and are negative.      Objective   Physical Exam  Constitutional:       Appearance: Normal appearance.   HENT:      Head: Normocephalic and atraumatic.   Pulmonary:      Effort: Pulmonary effort is normal.   Musculoskeletal:         General: Normal range of motion.   Skin:     General: Skin is warm and dry.   Neurological:      General: No focal deficit present.      Mental Status: She is alert and oriented to person, place, and time.   Psychiatric:         Mood and Affect: Mood normal.         Behavior: Behavior normal.       Anoscopy    Date/Time: 3/19/2024 3:15 PM    Performed by: PETTY Ho  Authorized by: PETTY Ho    Consent:     Consent obtained:  Verbal    Consent given by:  Patient    Risks, benefits, and alternatives were discussed: yes    Universal protocol:     Procedure explained and questions answered to patient or proxy's satisfaction: yes      Patient identity confirmed:  Verbally with patient  Post-procedure details:     Procedure completion:  Tolerated  Comments:      She has the small circumferential anal skin tags.  On anoscopy, you can see the ulceration from the RBL in the right lateral position.  No active bleeding, just discomfort.        Assessment/Plan   Lila's hemorrhoidal wound from the rubber band ligation is healing well.  She will continue to increase her fiber and water intake to keep her stools soft.  She will call with any issues and will follow up on an as needed basis.           PETTY Ho 03/14/24 12:03 PM

## 2024-03-19 PROCEDURE — 46600 DIAGNOSTIC ANOSCOPY SPX: CPT | Performed by: NURSE PRACTITIONER

## 2024-03-19 NOTE — PROGRESS NOTES
Subjective   Lila Grewal is a 69 y.o. female who presents for the following: Skin Check (annual).    Skin Cancer Screening  She has a history of moderate sun exposure. She is in the sun rarely. She uses sunscreen daily. She reports no skin symptoms. Her moles are not changing.    Spots that concern her: none      History of NMSC(s)/Dysplastic Nevi    1: basal cell carcinoma  Location: Right Pre-tibial Leg  Biopsy Date:  May 2020  Treatment:  Mohs at [facility]  Treatment Date:  July 2020    Objective   Well appearing patient in no apparent distress; mood and affect are within normal limits.    A full examination was performed including scalp, head, eyes, ears, nose, lips, neck, chest, axillae, abdomen, back, buttocks, bilateral upper extremities, bilateral lower extremities, hands, feet, fingers, toes, fingernails, and toenails. All findings within normal limits unless otherwise noted below.    All nevi were symmetric brown macules without atypia on dermoscopy.     Scattered cherry-red papule(s).    Scattered tan macules in sun-exposed areas.    Scar is well-healed without evidence of recurrence      Assessment/Plan   Melanocytic nevus, unspecified location    The ABCDEs of melanoma and warning signs of non-melanoma skin cancer were discussed with patient and patient expressed understanding. Sun protection and use of at least SPF 30 discussed with patient. Pt instructed to reapply every 2 hours.     Hemangioma, unspecified site    Lentigo    The ABCDEs of melanoma and warning signs of non-melanoma skin cancer were discussed with patient and patient expressed understanding. Sun protection and use of at least SPF 30 discussed with patient. Pt instructed to reapply every 2 hours.     Scar conditions and fibrosis of skin    Follow up in 1 year or sooner as needed.

## 2024-03-20 ENCOUNTER — OFFICE VISIT (OUTPATIENT)
Dept: DERMATOLOGY | Facility: CLINIC | Age: 69
End: 2024-03-20
Payer: MEDICARE

## 2024-03-20 DIAGNOSIS — L81.4 LENTIGO: ICD-10-CM

## 2024-03-20 DIAGNOSIS — D18.00 HEMANGIOMA, UNSPECIFIED SITE: ICD-10-CM

## 2024-03-20 DIAGNOSIS — Z85.828 PERSONAL HISTORY OF SKIN CANCER: ICD-10-CM

## 2024-03-20 DIAGNOSIS — L90.5 SCAR CONDITIONS AND FIBROSIS OF SKIN: Primary | ICD-10-CM

## 2024-03-20 DIAGNOSIS — D22.9 MELANOCYTIC NEVUS, UNSPECIFIED LOCATION: ICD-10-CM

## 2024-03-20 PROCEDURE — 1159F MED LIST DOCD IN RCRD: CPT | Performed by: DERMATOLOGY

## 2024-03-20 PROCEDURE — 1123F ACP DISCUSS/DSCN MKR DOCD: CPT | Performed by: DERMATOLOGY

## 2024-03-20 PROCEDURE — 1160F RVW MEDS BY RX/DR IN RCRD: CPT | Performed by: DERMATOLOGY

## 2024-03-20 PROCEDURE — 99213 OFFICE O/P EST LOW 20 MIN: CPT | Performed by: DERMATOLOGY

## 2024-03-20 PROCEDURE — 1036F TOBACCO NON-USER: CPT | Performed by: DERMATOLOGY

## 2024-04-18 ENCOUNTER — APPOINTMENT (OUTPATIENT)
Dept: SURGERY | Facility: CLINIC | Age: 69
End: 2024-04-18
Payer: MEDICARE

## 2024-08-26 ENCOUNTER — APPOINTMENT (OUTPATIENT)
Dept: OBSTETRICS AND GYNECOLOGY | Facility: CLINIC | Age: 69
End: 2024-08-26
Payer: MEDICARE

## 2024-09-01 ENCOUNTER — HOSPITAL ENCOUNTER (OUTPATIENT)
Dept: RADIOLOGY | Facility: EXTERNAL LOCATION | Age: 69
Discharge: HOME | End: 2024-09-01
Payer: MEDICARE

## 2024-09-01 DIAGNOSIS — Z12.31 ENCOUNTER FOR SCREENING MAMMOGRAM FOR BREAST CANCER: ICD-10-CM

## 2024-09-16 ENCOUNTER — HOSPITAL ENCOUNTER (OUTPATIENT)
Dept: RADIOLOGY | Facility: CLINIC | Age: 69
Discharge: HOME | End: 2024-09-16
Payer: MEDICARE

## 2024-09-16 DIAGNOSIS — Z13.820 SCREENING FOR OSTEOPOROSIS: ICD-10-CM

## 2024-09-16 DIAGNOSIS — Z78.0 MENOPAUSE: ICD-10-CM

## 2024-09-16 PROCEDURE — 77080 DXA BONE DENSITY AXIAL: CPT

## 2024-09-16 PROCEDURE — 77080 DXA BONE DENSITY AXIAL: CPT | Performed by: RADIOLOGY

## 2024-09-18 ENCOUNTER — TELEPHONE (OUTPATIENT)
Dept: PLASTIC SURGERY | Facility: CLINIC | Age: 69
End: 2024-09-18
Payer: MEDICARE

## 2024-09-18 DIAGNOSIS — R92.8 ABNORMAL MAMMOGRAM: ICD-10-CM

## 2024-09-18 DIAGNOSIS — N64.89 BREAST ASYMMETRY: Primary | ICD-10-CM

## 2024-09-18 NOTE — TELEPHONE ENCOUNTER
Focal asymmetry in the right breast requires additional evaluation.   Additional imaging is recommended.     I ordered diag mamm. Relay to patient. Have her get scheduled at [facility] where screening mamm was done.

## 2024-09-18 NOTE — TELEPHONE ENCOUNTER
Spoke to pt relayed mamm result  Focal asymmetry in the right breast requires additional evaluation.   Additional imaging is recommended.      I ordered diag mamm. Relay to patient. Have her get scheduled at [facility] where screening mamm was done.   Pt will call to let us know where to fax order to when she goes to schedule diag mamm

## 2024-09-19 ENCOUNTER — TELEPHONE (OUTPATIENT)
Dept: PRIMARY CARE | Facility: CLINIC | Age: 69
End: 2024-09-19
Payer: MEDICARE

## 2024-09-19 DIAGNOSIS — R92.8 ABNORMAL MAMMOGRAM: ICD-10-CM

## 2024-09-19 NOTE — TELEPHONE ENCOUNTER
Patient calling states [facility] order for diagnostic bilateral order   Doesn't think it needs to be bilateral just right side   Formatted please advise     Needs faxed to  [phone]

## 2024-11-11 ENCOUNTER — HOSPITAL ENCOUNTER (OUTPATIENT)
Dept: RADIOLOGY | Facility: CLINIC | Age: 69
Discharge: HOME | End: 2024-11-11
Payer: MEDICARE

## 2024-11-11 DIAGNOSIS — Z13.6 SCREENING FOR CARDIOVASCULAR CONDITION: ICD-10-CM

## 2024-11-11 DIAGNOSIS — R73.01 IMPAIRED FASTING GLUCOSE: ICD-10-CM

## 2024-11-11 DIAGNOSIS — R73.9 HYPERGLYCEMIA: ICD-10-CM

## 2024-11-11 PROCEDURE — 75571 CT HRT W/O DYE W/CA TEST: CPT

## 2024-11-13 DIAGNOSIS — R93.1 ABNORMAL SCREENING CARDIAC CT: Primary | ICD-10-CM

## 2025-02-16 LAB
25(OH)D3+25(OH)D2 SERPL-MCNC: 53 NG/ML (ref 30–100)
ALBUMIN SERPL-MCNC: 4.6 G/DL (ref 3.6–5.1)
ALP SERPL-CCNC: 59 U/L (ref 37–153)
ALT SERPL-CCNC: 10 U/L (ref 6–29)
ANION GAP SERPL CALCULATED.4IONS-SCNC: 10 MMOL/L (CALC) (ref 7–17)
APPEARANCE UR: CLEAR
AST SERPL-CCNC: 10 U/L (ref 10–35)
BACTERIA #/AREA URNS HPF: ABNORMAL /HPF
BACTERIA UR CULT: ABNORMAL
BASOPHILS # BLD AUTO: 21 CELLS/UL (ref 0–200)
BASOPHILS NFR BLD AUTO: 0.7 %
BILIRUB SERPL-MCNC: 1.9 MG/DL (ref 0.2–1.2)
BILIRUB UR QL STRIP: NEGATIVE
BUN SERPL-MCNC: 21 MG/DL (ref 7–25)
CALCIUM SERPL-MCNC: 9.3 MG/DL (ref 8.6–10.4)
CHLORIDE SERPL-SCNC: 101 MMOL/L (ref 98–110)
CO2 SERPL-SCNC: 27 MMOL/L (ref 20–32)
COLOR UR: YELLOW
CREAT SERPL-MCNC: 0.67 MG/DL (ref 0.5–1.05)
EGFRCR SERPLBLD CKD-EPI 2021: 95 ML/MIN/1.73M2
EOSINOPHIL # BLD AUTO: 30 CELLS/UL (ref 15–500)
EOSINOPHIL NFR BLD AUTO: 1 %
ERYTHROCYTE [DISTWIDTH] IN BLOOD BY AUTOMATED COUNT: 12.1 % (ref 11–15)
GLUCOSE SERPL-MCNC: 93 MG/DL (ref 65–99)
GLUCOSE UR QL STRIP: NEGATIVE
HCT VFR BLD AUTO: 40.9 % (ref 35–45)
HGB BLD-MCNC: 14 G/DL (ref 11.7–15.5)
HGB UR QL STRIP: NEGATIVE
HYALINE CASTS #/AREA URNS LPF: ABNORMAL /LPF
KETONES UR QL STRIP: ABNORMAL
LEUKOCYTE ESTERASE UR QL STRIP: ABNORMAL
LYMPHOCYTES # BLD AUTO: 1206 CELLS/UL (ref 850–3900)
LYMPHOCYTES NFR BLD AUTO: 40.2 %
MCH RBC QN AUTO: 32.9 PG (ref 27–33)
MCHC RBC AUTO-ENTMCNC: 34.2 G/DL (ref 32–36)
MCV RBC AUTO: 96.2 FL (ref 80–100)
MONOCYTES # BLD AUTO: 369 CELLS/UL (ref 200–950)
MONOCYTES NFR BLD AUTO: 12.3 %
NEUTROPHILS # BLD AUTO: 1374 CELLS/UL (ref 1500–7800)
NEUTROPHILS NFR BLD AUTO: 45.8 %
NITRITE UR QL STRIP: NEGATIVE
PH UR STRIP: 6 [PH] (ref 5–8)
PLATELET # BLD AUTO: 162 THOUSAND/UL (ref 140–400)
PMV BLD REES-ECKER: 11.1 FL (ref 7.5–12.5)
POTASSIUM SERPL-SCNC: 4.6 MMOL/L (ref 3.5–5.3)
PROT SERPL-MCNC: 6.8 G/DL (ref 6.1–8.1)
PROT UR QL STRIP: NEGATIVE
RBC # BLD AUTO: 4.25 MILLION/UL (ref 3.8–5.1)
RBC #/AREA URNS HPF: ABNORMAL /HPF
SERVICE CMNT-IMP: ABNORMAL
SODIUM SERPL-SCNC: 138 MMOL/L (ref 135–146)
SP GR UR STRIP: 1.02 (ref 1–1.03)
SQUAMOUS #/AREA URNS HPF: ABNORMAL /HPF
WBC # BLD AUTO: 3 THOUSAND/UL (ref 3.8–10.8)
WBC #/AREA URNS HPF: ABNORMAL /HPF

## 2025-02-18 ENCOUNTER — TELEPHONE (OUTPATIENT)
Dept: PRIMARY CARE | Facility: CLINIC | Age: 70
End: 2025-02-18
Payer: MEDICARE

## 2025-02-18 NOTE — TELEPHONE ENCOUNTER
Pt called states had labs done but they did not do lipid panel called [facility] and had them add it on they will let us know if it cannot be done

## 2025-02-20 ENCOUNTER — TELEPHONE (OUTPATIENT)
Dept: PRIMARY CARE | Facility: CLINIC | Age: 70
End: 2025-02-20

## 2025-02-20 ENCOUNTER — APPOINTMENT (OUTPATIENT)
Dept: PRIMARY CARE | Facility: CLINIC | Age: 70
End: 2025-02-20
Payer: MEDICARE

## 2025-02-20 VITALS
OXYGEN SATURATION: 97 % | DIASTOLIC BLOOD PRESSURE: 65 MMHG | BODY MASS INDEX: 28.59 KG/M2 | HEIGHT: 65 IN | SYSTOLIC BLOOD PRESSURE: 125 MMHG | HEART RATE: 78 BPM | WEIGHT: 171.6 LBS | TEMPERATURE: 97.5 F

## 2025-02-20 DIAGNOSIS — Z13.6 ENCOUNTER FOR SCREENING FOR CARDIOVASCULAR DISORDERS: ICD-10-CM

## 2025-02-20 DIAGNOSIS — K14.6 TONGUE BURNING SENSATION: ICD-10-CM

## 2025-02-20 DIAGNOSIS — Z71.89 ADVANCED DIRECTIVES, COUNSELING/DISCUSSION: ICD-10-CM

## 2025-02-20 DIAGNOSIS — K76.89 LIVER CYST: ICD-10-CM

## 2025-02-20 DIAGNOSIS — R79.9 ABNORMAL FINDING OF BLOOD CHEMISTRY, UNSPECIFIED: ICD-10-CM

## 2025-02-20 DIAGNOSIS — R93.89 ABNORMAL FINDINGS ON DIAGNOSTIC IMAGING OF OTHER SPECIFIED BODY STRUCTURES: ICD-10-CM

## 2025-02-20 DIAGNOSIS — Z13.9 ENCOUNTER FOR SCREENING INVOLVING SOCIAL DETERMINANTS OF HEALTH (SDOH): ICD-10-CM

## 2025-02-20 DIAGNOSIS — Z00.00 ROUTINE ADULT HEALTH MAINTENANCE: Primary | ICD-10-CM

## 2025-02-20 DIAGNOSIS — E80.4 GILBERT SYNDROME: ICD-10-CM

## 2025-02-20 DIAGNOSIS — Z71.89 CARDIAC RISK COUNSELING: ICD-10-CM

## 2025-02-20 DIAGNOSIS — R93.1 ABNORMAL SCREENING CARDIAC CT: ICD-10-CM

## 2025-02-20 DIAGNOSIS — R03.0 WHITE COAT SYNDROME WITHOUT DIAGNOSIS OF HYPERTENSION: ICD-10-CM

## 2025-02-20 DIAGNOSIS — Z13.89 SCREENING FOR MULTIPLE CONDITIONS: ICD-10-CM

## 2025-02-20 DIAGNOSIS — Z12.11 SCREENING FOR COLON CANCER: ICD-10-CM

## 2025-02-20 DIAGNOSIS — D72.819 LEUKOPENIA, UNSPECIFIED TYPE: ICD-10-CM

## 2025-02-20 DIAGNOSIS — K21.9 GASTROESOPHAGEAL REFLUX DISEASE, UNSPECIFIED WHETHER ESOPHAGITIS PRESENT: ICD-10-CM

## 2025-02-20 DIAGNOSIS — Z12.31 ENCOUNTER FOR SCREENING MAMMOGRAM FOR BREAST CANCER: ICD-10-CM

## 2025-02-20 DIAGNOSIS — E55.9 VITAMIN D DEFICIENCY: ICD-10-CM

## 2025-02-20 PROBLEM — R92.8 ABNORMAL MAMMOGRAM: Status: RESOLVED | Noted: 2024-09-18 | Resolved: 2025-02-20

## 2025-02-20 PROBLEM — K44.9 HIATAL HERNIA: Status: ACTIVE | Noted: 2025-02-20

## 2025-02-20 PROBLEM — Z13.39 ALCOHOL SCREENING: Status: ACTIVE | Noted: 2025-02-20

## 2025-02-20 PROBLEM — N64.89 BREAST ASYMMETRY: Status: RESOLVED | Noted: 2024-09-18 | Resolved: 2025-02-20

## 2025-02-20 PROBLEM — R73.01 IMPAIRED FASTING GLUCOSE: Status: RESOLVED | Noted: 2024-01-13 | Resolved: 2025-02-20

## 2025-02-20 PROBLEM — D69.6 THROMBOCYTOPENIA (CMS-HCC): Status: RESOLVED | Noted: 2024-02-19 | Resolved: 2025-02-20

## 2025-02-20 PROCEDURE — 3078F DIAST BP <80 MM HG: CPT | Performed by: INTERNAL MEDICINE

## 2025-02-20 PROCEDURE — G0446 INTENS BEHAVE THER CARDIO DX: HCPCS | Performed by: INTERNAL MEDICINE

## 2025-02-20 PROCEDURE — 99214 OFFICE O/P EST MOD 30 MIN: CPT | Performed by: INTERNAL MEDICINE

## 2025-02-20 PROCEDURE — 1036F TOBACCO NON-USER: CPT | Performed by: INTERNAL MEDICINE

## 2025-02-20 PROCEDURE — 3074F SYST BP LT 130 MM HG: CPT | Performed by: INTERNAL MEDICINE

## 2025-02-20 PROCEDURE — G0136 PR ADMINISTRATION OF A STANDARDIZED, EVIDENCE-BASED SOCIAL DETERMINANTS OF HEALTH RISK ASSESSMENT TOOL, 5 TO 15 MINUTES: HCPCS | Performed by: INTERNAL MEDICINE

## 2025-02-20 PROCEDURE — 99497 ADVNCD CARE PLAN 30 MIN: CPT | Performed by: INTERNAL MEDICINE

## 2025-02-20 PROCEDURE — 3008F BODY MASS INDEX DOCD: CPT | Performed by: INTERNAL MEDICINE

## 2025-02-20 PROCEDURE — 1160F RVW MEDS BY RX/DR IN RCRD: CPT | Performed by: INTERNAL MEDICINE

## 2025-02-20 PROCEDURE — G2211 COMPLEX E/M VISIT ADD ON: HCPCS | Performed by: INTERNAL MEDICINE

## 2025-02-20 PROCEDURE — G0444 DEPRESSION SCREEN ANNUAL: HCPCS | Performed by: INTERNAL MEDICINE

## 2025-02-20 PROCEDURE — 1159F MED LIST DOCD IN RCRD: CPT | Performed by: INTERNAL MEDICINE

## 2025-02-20 PROCEDURE — 1123F ACP DISCUSS/DSCN MKR DOCD: CPT | Performed by: INTERNAL MEDICINE

## 2025-02-20 PROCEDURE — G0439 PPPS, SUBSEQ VISIT: HCPCS | Performed by: INTERNAL MEDICINE

## 2025-02-20 SDOH — ECONOMIC STABILITY: FOOD INSECURITY: WITHIN THE PAST 12 MONTHS, YOU WORRIED THAT YOUR FOOD WOULD RUN OUT BEFORE YOU GOT MONEY TO BUY MORE.: NEVER TRUE

## 2025-02-20 SDOH — ECONOMIC STABILITY: FOOD INSECURITY: WITHIN THE PAST 12 MONTHS, THE FOOD YOU BOUGHT JUST DIDN'T LAST AND YOU DIDN'T HAVE MONEY TO GET MORE.: NEVER TRUE

## 2025-02-20 SDOH — ECONOMIC STABILITY: INCOME INSECURITY: IN THE LAST 12 MONTHS, WAS THERE A TIME WHEN YOU WERE NOT ABLE TO PAY THE MORTGAGE OR RENT ON TIME?: NO

## 2025-02-20 SDOH — ECONOMIC STABILITY: TRANSPORTATION INSECURITY
IN THE PAST 12 MONTHS, HAS THE LACK OF TRANSPORTATION KEPT YOU FROM MEDICAL APPOINTMENTS OR FROM GETTING MEDICATIONS?: NO

## 2025-02-20 SDOH — ECONOMIC STABILITY: TRANSPORTATION INSECURITY
IN THE PAST 12 MONTHS, HAS LACK OF TRANSPORTATION KEPT YOU FROM MEETINGS, WORK, OR FROM GETTING THINGS NEEDED FOR DAILY LIVING?: NO

## 2025-02-20 ASSESSMENT — PATIENT HEALTH QUESTIONNAIRE - PHQ9
1. LITTLE INTEREST OR PLEASURE IN DOING THINGS: NOT AT ALL
SUM OF ALL RESPONSES TO PHQ9 QUESTIONS 1 AND 2: 0
2. FEELING DOWN, DEPRESSED OR HOPELESS: NOT AT ALL

## 2025-02-20 ASSESSMENT — SOCIAL DETERMINANTS OF HEALTH (SDOH): IN THE PAST 12 MONTHS, HAS THE ELECTRIC, GAS, OIL, OR WATER COMPANY THREATENED TO SHUT OFF SERVICE IN YOUR HOME?: NO

## 2025-02-20 NOTE — ASSESSMENT & PLAN NOTE
Annual Wellness exam completed   Preventive Health History reviewed  Ordered:   Labs    Mammogram   Cscope

## 2025-02-20 NOTE — TELEPHONE ENCOUNTER
Pharmacy called in for Recipe for Nystatin/Doxycycline/Hydrocortisone/Diphenhydramine Oral Susp.     I provided:      Components    Component Recipe Dispense Quantity   nystatin 100,000 unit/mL suspension 100 mL 33.3 mL   doxycycline 100 mg capsule 200 mg 0.6667 capsule   hydrocortisone (bulk) powder 1,000 mg 0.3333 g   diphenhydrAMINE 12.5 mg/5 mL liquid 200 mL

## 2025-02-20 NOTE — PROGRESS NOTES
Annual Medicare Wellness Exam/Comprehensive Problem Focused Follow Up  HPI/CC  Chief Complaint   Patient presents with    Medicare Annual Wellness Visit Subsequent     Reviewed chart for care received since last appointment.     s/p rubber band ligation ON 2/29/24.   Had CT calcium  score : 1 (LM)   Smallhiatal hernia.    Has a burning tongue since 8/24  Changed her diet and that helped  PPI didn't help  Occ has taste in throat of acid    BP at home 120s/60s  BP cuff calibration 1/20: 130/81 ours, 132/80 hers     :abs reviewed:  Component      Latest Ref Rng 2/15/2025   WHITE BLOOD CELL COUNT      3.8 - 10.8 Thousand/uL 3.0 (L)    RED BLOOD CELL COUNT      3.80 - 5.10 Million/uL 4.25    HEMOGLOBIN      11.7 - 15.5 g/dL 14.0    HEMATOCRIT      35.0 - 45.0 % 40.9    MCV      80.0 - 100.0 fL 96.2    MCH      27.0 - 33.0 pg 32.9    MCHC      32.0 - 36.0 g/dL 34.2    RDW      11.0 - 15.0 % 12.1    PLATELET COUNT      140 - 400 Thousand/uL 162    MPV      7.5 - 12.5 fL 11.1    ABSOLUTE NEUTROPHILS      1,500 - 7,800 cells/uL 1,374 (L)    ABSOLUTE LYMPHOCYTES      850 - 3,900 cells/uL 1,206    ABSOLUTE MONOCYTES      200 - 950 cells/uL 369    ABSOLUTE EOSINOPHILS      15 - 500 cells/uL 30    ABSOLUTE BASOPHILS      0 - 200 cells/uL 21    NEUTROPHILS      % 45.8    LYMPHOCYTES      % 40.2    MONOCYTES      % 12.3    EOSINOPHILS      % 1.0    BASOPHILS      % 0.7    COLOR      YELLOW  YELLOW    APPEARANCE      CLEAR  CLEAR    SPECIFIC GRAVITY      1.001 - 1.035  1.016    PH      5.0 - 8.0  6.0    GLUCOSE      NEGATIVE  NEGATIVE    BILIRUBIN      NEGATIVE  NEGATIVE    KETONES      NEGATIVE  1+ !    OCCULT BLOOD      NEGATIVE  NEGATIVE    PROTEIN      NEGATIVE  NEGATIVE    NITRITE      NEGATIVE  NEGATIVE    LEUKOCYTE ESTERASE      NEGATIVE  1+ !    WBC      < OR = 5 /HPF 0-5    RBC      < OR = 2 /HPF NONE SEEN    SQUAMOUS EPITHELIAL CELLS      < OR = 5 /HPF 10-20 !    BACTERIA      NONE SEEN /HPF NONE SEEN    HYALINE CAST       NONE SEEN /LPF NONE SEEN    NOTE --    CULTURE, URINE, ROUTINE --    GLUCOSE      65 - 99 mg/dL 93    UREA NITROGEN (BUN)      7 - 25 mg/dL 21    CREATININE      0.50 - 1.05 mg/dL 0.67    EGFR      > OR = 60 mL/min/1.73m2 95    SODIUM      135 - 146 mmol/L 138    POTASSIUM      3.5 - 5.3 mmol/L 4.6    CHLORIDE      98 - 110 mmol/L 101    CARBON DIOXIDE      20 - 32 mmol/L 27    ELECTROLYTE BALANCE      7 - 17 mmol/L (calc) 10    CALCIUM      8.6 - 10.4 mg/dL 9.3    PROTEIN, TOTAL      6.1 - 8.1 g/dL 6.8    ALBUMIN      3.6 - 5.1 g/dL 4.6    BILIRUBIN, TOTAL      0.2 - 1.2 mg/dL 1.9 (H)    ALKALINE PHOSPHATASE      37 - 153 U/L 59    AST      10 - 35 U/L 10    ALT      6 - 29 U/L 10    CHOLESTEROL, TOTAL      <200 mg/dL 175    HDL CHOLESTEROL      > OR = 50 mg/dL 71    TRIGLYCERIDES      <150 mg/dL 61    LDL-CHOLESTEROL      mg/dL (calc) 89    CHOL/HDLC RATIO      <5.0 (calc) 2.5    NON HDL CHOLESTEROL      <130 mg/dL (calc) 104    VITAMIN D,25-OH,TOTAL,IA      30 - 100 ng/mL 53       Bilirubin, Total  0.0 - 1.2 mg/dL 0.9 1.9 High  1.6 High  1.5 High  1.0 1.9 High      Component  Ref Range & Units 1 yr ago  (2/9/24) 2 yr ago  (1/27/23) 3 yr ago  (1/10/22) 3 yr ago  (6/4/21)   Bilirubin, Direct  0.0 - 0.3 mg/dL 0.2 0.3 0.3 0.2     WBC  4.4 - 11.3 x10*3/uL 3.2 Low  3.1 Low  R 4.6 R 3.8 Low  R 3.1 Low  R 6.4 R     Assessment and Plan:  Problem List Items Addressed This Visit          High    Routine adult health maintenance - Primary    Overview         Influenza vaccine 10/1/21, 9/6/22, 10/11/23, 11/13/24      RSV declined      PPSV 1/14/22      PCV 20 2/2/23      Td 1/7/20      Moderna Covid 19 vaccine 3/5/21, 4/2/21, 11/12/21, 4/26/22, 11/7/22, 10/11/23      Shingrix 1/11/21, 6/14/21     Tdap (Age 7+)4/29/2011      Cscope 2014 (7yrs); 7/1/20 (7yrs)      Mamm 3/26/19; 6/24/2020, 9/13/21, 9/27/22, 9/22/24      BMD 12/31/13, 6/24/20; 9/19/22; 9/2024       Hep C Ab (-) 3/7/18      s/p JESSICA      BP cuff calibration 1/20:  130/81 ours, 132/80 hers      4/19/21: US aorta: normal      2/2/23: Current 10-Year ASCVD Risk: 8.19% - Intermediate Risk         Current Assessment & Plan     Annual Wellness exam completed   Preventive Health History reviewed  Ordered:   Labs    Mammogram   Cscope                 Medium    Abnormal screening cardiac CT    Overview     11/13/24: Coronary artery calcium score of 1.3 (LM)  LDL 76  Goal < 55  Asked cardiology about this: Advised check a Lp(a) and HS-CRP. If these are normal would hold off on treatment with statin and then repeat a coronary calcium score in a few years.  If those are abnormal then you have more evidence that she would benefit from statin   Will discuss at next appt         Current Assessment & Plan     Will add on Lpa and CRPhs  Consult to preventive cardiology         Relevant Orders    Referral to Cardiology    Lipid Panel    High sensitivity CRP    Lipoprotein A (LPA)    Advanced directives, counseling/discussion    Overview     2/20/25: HCPOA: Boo Guillen ()   FULL CODE  Has particular instructions for vegetative state in her LW         Cardiac risk counseling    Overview     2/20/25: Current 10-Year ASCVD Risk: 6.5% - Intermediate Risk   no ASA rec'd per current guidelines         Encounter for screening involving social determinants of health (SDoH)    Overview     02/20/25: 5 minutes spent on SDOH screening.  Specifically: Housing, Food Insecurity, Utilities and Transportatin Needs were evaluated   (See Screenings in Rooming section for documentation)           Encounter for screening mammogram for breast cancer    Relevant Orders    BI mammo bilateral screening tomosynthesis    Gastroesophageal reflux disease    Overview     HH seen on CT calcium score 2024  Has seen ENT in past         Current Assessment & Plan     Will try PPI or H2B  Will get EGD with Cscope         Relevant Orders    Esophagogastroduodenoscopy (EGD)    Gilbert syndrome    Leukopenia    Overview      Amari was 3.1 in 1/21 and 3.0 in 2/25  Monitor         Relevant Orders    TSH with reflex to Free T4 if abnormal    Comprehensive Metabolic Panel    CBC and Auto Differential    Urinalysis with Reflex Microscopic    Tsh With Reflex To Free T4 If Abnormal    Liver cyst    Overview     Comment on above: US 2018: Anechoic cyst in the left hepatic lobe measures 1.3 x 0.9 x 0.8 cm and contains a thin internal septation. Additional anechoic cyst in the right lobe measures 4 x 6 x 6 mm;         Relevant Orders    Comprehensive Metabolic Panel    Screening for colon cancer    Relevant Orders    Colonoscopy Screening; Average Risk Patient    Screening for multiple conditions    Overview     Depression screening completed using the PHQ-2 questions with results documented in the chart/encounter  (See Rooming Screenings for documentation)           Relevant Medications    Nystatin/Doxycycline/Hydrocortisone/Diphenhydramine Oral Susp    Vitamin D deficiency    Overview     Comment on above: Goal 40-50;         Relevant Orders    Vitamin D 25-Hydroxy,Total (for eval of Vitamin D levels)    White coat syndrome without diagnosis of hypertension    Overview     BP cuff calibration 1/20: 130/81 ours, 132/80 hers          Current Assessment & Plan     BP cuff needs checked for accuracy          Other Visit Diagnoses       Tongue burning sensation        ? due to GERD vs other  Saw dentiast and did oral Mouthwash    Abnormal findings on diagnostic imaging of other specified body structures        Relevant Orders    TSH with reflex to Free T4 if abnormal    Tsh With Reflex To Free T4 If Abnormal    Encounter for screening for cardiovascular disorders        Relevant Orders    High sensitivity CRP    Lipoprotein A (LPA)    Abnormal finding of blood chemistry, unspecified        checking additional labs    Relevant Orders    Lipid Panel          ROS otherwise negative aside from what was mentioned above in HPI.    Vitals  /65  "Comment: calibrated BP cuff home reading  Pulse 78   Temp 36.4 °C (97.5 °F)   Ht 1.651 m (5' 5\")   Wt 77.8 kg (171 lb 9.6 oz)   SpO2 97%   BMI 28.56 kg/m²   Body mass index is 28.56 kg/m².  Physical Exam  Gen: Alert, NAD  HEENT:  Unremarkable  Neck:  No ENA  Respiratory:  Lungs CTAB  Cardiovascular:  Heart RRR  Neuro:  Gross motor and sensory intact  Skin:  No suspicious lesions present  Breast: No masses, or axillary lymphadenopathy  Gyn: Normal pelvic exam: no uterine masses or cervical lesions, or CMT; no vaginal D/C. No ovarian or adnexal masses; No external vaginal or anal/perineal lesions (Pt declined chaperone)      Patient Care Team:  Oneyda Sims MD as PCP - General (Internal Medicine)  Oneyda Sims MD as PCP - [facility]  Monika Domínguez MD as Consulting Physician (Dermatology)       Health Risk Assessment:  Patient was asked if he/she has any difficulty performing the following activities of daily living:  Preparing nutritious food and eating? No  Grocery shopping? No  Bathing and grooming yourself? No  Getting dressed? No  Using the toilet?No  Using the phone? No  Moving around from place to place (physical ambulation)? No  Doing housework (including laundry) independently? No  Managing finances independently? No  Managing medications independently? No  Doing housework (including laundry) independently? No    Patient was asked if he/she:  Feels safe in current home environment?: Yes  Uses seatbelt? Yes  Sees the dentist regularly? Yes  Exercises regularly: Yes  Suffers from depression, stress, anger, loneliness or social isolation, pain, suicidality? No    Dietary issues discussed: Yes  Cognitive Impairment No  Hearing difficulties: No  Visual Acuity assessed: No    What is your self-assessment of overall health status and life satisfaction? Good     5 minutes spent on SDOH screening:   Specifically Housing, Food Insecurity, Utilities and Transportatin Needs were " evaluated   (See Screenings in Rooming section for documentation)  Food Insecurity: No Food Insecurity (2/20/2025)    Hunger Vital Sign     Worried About Running Out of Food in the Last Year: Never true     Ran Out of Food in the Last Year: Never true     Housing Stability: Unknown (2/20/2025)    Housing Stability Vital Sign     Unable to Pay for Housing in the Last Year: No     Number of Times Moved in the Last Year: Not on file     Homeless in the Last Year: No     Transportation Needs: No Transportation Needs (2/20/2025)    PRAPARE - Transportation     Lack of Transportation (Medical): No     Lack of Transportation (Non-Medical): No         Allergies and Medications  Patient has no known allergies.  Current Outpatient Medications   Medication Instructions    bimatoprost (Latisse) 0.03 % ophthalmic solution Nightly    cyanocobalamin (VITAMIN B-12) 1,000 mcg, Daily    Nystatin/Doxycycline/Hydrocortisone/Diphenhydramine Oral Susp 10 mL, Swish & Spit, 3 times daily, Shake well. Swish, gargle and spit.    vitamin D3-vitamin K2, MK4, (K2 Plus D3) 1,000-100 unit-mcg tablet 1 tablet, Daily       Medications and Supplements  prescribed by me and other practitioners or clinical pharmacist (such as prescriptions, OTC's, herbal therapies and supplements) were reviewed and documented in the medical record.      Active Problem List  Patient Active Problem List   Diagnosis    Routine adult health maintenance    Advanced directives, counseling/discussion    Cardiac risk counseling    History of nonmelanoma skin cancer    Screening for multiple conditions    Encounter for gynecological examination without abnormal finding    Arthritis of right hip    Disorder of bone density and structure, unspecified    External hemorrhoids    Gastroesophageal reflux disease    Gilbert syndrome    Liver cyst    History of anemia    Leukopenia    Positive antinuclear antibody    Rectocele, female    Postmenopausal atrophic vaginitis    Vitamin D  deficiency    White coat syndrome without diagnosis of hypertension    BMI 28.0-28.9,adult    Screening for osteoporosis    Menopause    Encounter for screening mammogram for breast cancer    Abnormal screening cardiac CT    Alcohol screening    Encounter for screening involving social determinants of health (SDoH)    Hiatal hernia    Screening for colon cancer       Comprehensive Medical/Surgical/Social/Family History  Past Medical History:   Diagnosis Date    Abnormal screening cardiac CT 11/13/2024    Coronary artery calcium score of 1.3 (LM)    H/O abdominal ultrasound     2018: HEPATIC CYSTS, THE ONE IN THE LEFT HEPATIC LOBE IS MILDLY COMPLEX.     Anechoic cyst in the left hepatic lobe measures 1.3 x 0.9      x 0.8 cm and contains a thin internal septation. Additional anechoic      cyst in the right lobe measures 4 x 6 x 6 mm    H/O bone density study     9/19/22: Ten Year Major Osteoporotic Fracture Risk: 8.67%      Ten Year Hip Fracture Risk: 0.91%      T-Score: -1.5      2013: -1.1, FRAX 5.2/0.3%; -1.2     6/20: Ten Year Major Osteoporotic Fracture Risk: 9.39%      Ten Year Hip Fracture Risk: 1.05%      T-Score: -1.7    H/O bone density study 09/16/2024    -1.6 L femur. FRAX 9.5/1.2    H/O chest x-ray     1/21: BONES:     Lower thoracic mild dextrocurvature may be partially exaggerated by     positioning. Mild multilevel anterior endplate spurring present in     the spine.          IMPRESSION:     1. Hyperinflation. Mild diffuse interstitial prominence which may be chronic. No localized     consolidation.    H/O diagnostic ultrasound 09/23/2023    R Breast US Benign fibrocystic changes in upper outer quadrant. No evidence of malignancy. 1 yr screening rec'd    H/O Doppler ultrasound     4/21: US aorta (-)    H/O magnetic resonance imaging     6/2019: MRI HIP: MILD RIGHT HIP OSTEOARTHRITIS.     BILATERAL GREATER TROCHANTERIC BURSITIS, RIGHT GREATER THAN LEFT.     CALCIFIC TENDINOSIS LEFT HAMSTRING ORIGIN.       Right hip: Increased signal within the anterior superior labrum with          small tear and small subchondral cystic changes in the superior      acetabulum. Small areas of partial-thickness cartilage loss.    H/O x-ray of lumbar spine     : DEGENERATIVE DISK DISEASE    History of x-ray of hip     : RESULT:          Right hip joint is maintained. Mild hypertrophic changes at the greater      trochanter. Left hip joint, sacroiliac joints and pubic symphysis are      also maintained. Calcified deposit at left hamstring origin has the      appearance of calcific tendinitis. No acute fracture or dislocation.      There are no bony erosions.     Past Surgical History:   Procedure Laterality Date    BAND HEMORRHOIDECTOMY  2024    COLONOSCOPY      2014: normal     2020: normal    LARYNGOSCOPY      : erythema of the posterior glottis c/w GERD    OTHER SURGICAL HISTORY  2021    Skin biopsy    OTHER SURGICAL HISTORY  2021    Mohs surgery    OTHER SURGICAL HISTORY  05/15/2019    Dilation and curettage    TOTAL ABDOMINAL HYSTERECTOMY      , ovaries remain     Social History     Social History Narrative    Family History        M: Diabetes, CAD/MI age 70s, HTN. 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  Patient Health Questionnaire-2 Score: 0 (2025 12:06 PM)  (See also  Rooming/Screening section for documentation, and/or progress note for additional information)    Cardiac Risk Assessment (15 minutes spent on this)  The 10-year ASCVD risk score (Ayana MUSE, et al., 2019) is: 6.5%    Values used to calculate the score:      Age: 70 years      Sex: Female      Is Non- : No      Diabetic: No      Tobacco smoker: No      Systolic Blood Pressure: 125 mmHg      Is BP treated: No      HDL Cholesterol: 71 mg/dL      Total Cholesterol: 175 mg/dL    Cardiovascular risk was discussed and, if needed, lifestyle modifications recommended, including nutritional choices, exercise, and elimination of habits contributing to risk. We agreed on a plan to reduce the current cardiovascular risk based on above discussion as needed.     Aspirin use/disuse was discussed and documented in the Problem List of the medical record (under Cardiac Risk Counseling) after reviewing the updated guidelines below:  Consider low dose Aspirin ( mg) use if the benefit for cardiovascular disease prevention outweighs risk for bleeding complications.   Discussed that in general, low dose ASA should be considered:  In patients WITHOUT prior MI/stroke/PAD (primary prevention):   a. Age <60: Use if 10-year cardiovascular disease risk >20%, with discussion of risks and benefits with patient  b. Age 60-<70: Use if 10-year cardiovascular disease risk >20% and low bleeding (e.g., gastrointestinal) risk, with discussion of risks and benefits with patient  c. Age >=70: Do not use    In patients WITH prior MI/stroke/PAD (secondary prevention):   Generally use unless extremely high bleeding (e.g., gastrointenstinal) risk, with discussion of risks and benefits with patient    Advance Directives Discussion  Advanced Care Planning (including a Living Will, Healthcare POA, as well as specific end of life choices and/or directives), was discussed with the patient and/or surrogate, voluntarily, and details of  that discussion documented in the Problem List (under Advanced Directives Discussion) of the medical record.   (16 min spent discussing above)     During the course of the visit the patient was educated and counseled about age appropriate screening and preventive services.   Completed preventive screenings were documented in the chart (see Routine Health Maintenance in Problem List) and orders were placed for outstanding screenings/procedures as documented in the Assessment and Plan.    Patient Instructions (the written plan) was given to the patient at check out as part of the AVS.

## 2025-02-22 LAB
25(OH)D3+25(OH)D2 SERPL-MCNC: 53 NG/ML (ref 30–100)
ALBUMIN SERPL-MCNC: 4.6 G/DL (ref 3.6–5.1)
ALP SERPL-CCNC: 59 U/L (ref 37–153)
ALT SERPL-CCNC: 10 U/L (ref 6–29)
ANION GAP SERPL CALCULATED.4IONS-SCNC: 10 MMOL/L (CALC) (ref 7–17)
AST SERPL-CCNC: 10 U/L (ref 10–35)
BASOPHILS # BLD AUTO: 21 CELLS/UL (ref 0–200)
BASOPHILS NFR BLD AUTO: 0.7 %
BILIRUB SERPL-MCNC: 1.9 MG/DL (ref 0.2–1.2)
BUN SERPL-MCNC: 21 MG/DL (ref 7–25)
CALCIUM SERPL-MCNC: 9.3 MG/DL (ref 8.6–10.4)
CHLORIDE SERPL-SCNC: 101 MMOL/L (ref 98–110)
CHOLEST SERPL-MCNC: 175 MG/DL
CHOLEST/HDLC SERPL: 2.5 (CALC)
CO2 SERPL-SCNC: 27 MMOL/L (ref 20–32)
CREAT SERPL-MCNC: 0.67 MG/DL (ref 0.5–1.05)
CRP SERPL HS-MCNC: 1.1 MG/L
EGFRCR SERPLBLD CKD-EPI 2021: 95 ML/MIN/1.73M2
EOSINOPHIL # BLD AUTO: 30 CELLS/UL (ref 15–500)
EOSINOPHIL NFR BLD AUTO: 1 %
ERYTHROCYTE [DISTWIDTH] IN BLOOD BY AUTOMATED COUNT: 12.1 % (ref 11–15)
GLUCOSE SERPL-MCNC: 93 MG/DL (ref 65–99)
HCT VFR BLD AUTO: 40.9 % (ref 35–45)
HDLC SERPL-MCNC: 71 MG/DL
HGB BLD-MCNC: 14 G/DL (ref 11.7–15.5)
LDLC SERPL CALC-MCNC: 89 MG/DL (CALC)
LPA SERPL-SCNC: 129 NMOL/L
LYMPHOCYTES # BLD AUTO: 1206 CELLS/UL (ref 850–3900)
LYMPHOCYTES NFR BLD AUTO: 40.2 %
MCH RBC QN AUTO: 32.9 PG (ref 27–33)
MCHC RBC AUTO-ENTMCNC: 34.2 G/DL (ref 32–36)
MCV RBC AUTO: 96.2 FL (ref 80–100)
MONOCYTES # BLD AUTO: 369 CELLS/UL (ref 200–950)
MONOCYTES NFR BLD AUTO: 12.3 %
NEUTROPHILS # BLD AUTO: 1374 CELLS/UL (ref 1500–7800)
NEUTROPHILS NFR BLD AUTO: 45.8 %
NONHDLC SERPL-MCNC: 104 MG/DL (CALC)
PLATELET # BLD AUTO: 162 THOUSAND/UL (ref 140–400)
PMV BLD REES-ECKER: 11.1 FL (ref 7.5–12.5)
POTASSIUM SERPL-SCNC: 4.6 MMOL/L (ref 3.5–5.3)
PROT SERPL-MCNC: 6.8 G/DL (ref 6.1–8.1)
RBC # BLD AUTO: 4.25 MILLION/UL (ref 3.8–5.1)
SODIUM SERPL-SCNC: 138 MMOL/L (ref 135–146)
TRIGL SERPL-MCNC: 61 MG/DL
TSH SERPL-ACNC: 1.99 MIU/L (ref 0.4–4.5)
WBC # BLD AUTO: 3 THOUSAND/UL (ref 3.8–10.8)

## 2025-02-23 PROBLEM — E78.41 ELEVATED LIPOPROTEIN A LEVEL: Status: ACTIVE | Noted: 2025-02-23

## 2025-03-01 ENCOUNTER — TELEPHONE (OUTPATIENT)
Dept: GASTROENTEROLOGY | Facility: CLINIC | Age: 70
End: 2025-03-01
Payer: MEDICARE

## 2025-03-01 NOTE — TELEPHONE ENCOUNTER
Called patient to schedule procedures ordered by Dr. Sims. Patient states she does not have her calendar at the present time and will call back to schedule.

## 2025-03-18 ENCOUNTER — APPOINTMENT (OUTPATIENT)
Dept: PRIMARY CARE | Facility: CLINIC | Age: 70
End: 2025-03-18
Payer: MEDICARE

## 2025-03-18 DIAGNOSIS — R03.0 WHITE COAT SYNDROME WITHOUT DIAGNOSIS OF HYPERTENSION: ICD-10-CM

## 2025-03-18 NOTE — PROGRESS NOTES
Patients BP with her machine : 175/76  HR 74  At home today 3/18/2025 118/60  Our BP machine: 162/70  HR 71  Placed recent readings on PCP desk.

## 2025-03-25 NOTE — PROGRESS NOTES
Subjective     Lila Grewal is a 70 y.o. female who presents for the following: Skin Check (Annual. Hx of BCC. Pt reports area of concern located on Right Axilla, causes irritation and discomfort. ).     Skin Cancer Screening  She has a history of heavy sun exposure. She is in the sun daily. She uses sunscreen daily. She reports no skin symptoms. Her moles are  irritated .    Spots that concern her: Right Axilla    HX of BCC 5/13/20    Review of Systems:  No other skin or systemic complaints other than what is documented elsewhere in the note.    The following portions of the chart were reviewed this encounter and updated as appropriate:       Skin Cancer History      Specialty Problems          Dermatology Problems    History of nonmelanoma skin cancer     7/20 BCSC removed via MOHs           Past Medical History:  Lila Grewal  has a past medical history of Abnormal screening cardiac CT (11/13/2024), H/O abdominal ultrasound, H/O bone density study, H/O bone density study (09/16/2024), H/O chest x-ray, H/O diagnostic ultrasound (09/23/2023), H/O Doppler ultrasound, H/O magnetic resonance imaging, H/O x-ray of lumbar spine, and History of x-ray of hip.    Past Surgical History:  Lila Grewal  has a past surgical history that includes Other surgical history (01/11/2021); Other surgical history (01/11/2021); Colonoscopy; Total abdominal hysterectomy; Laryngoscopy; Other surgical history (05/15/2019); and Band hemorrhoidectomy (02/29/2024).    Family History:  Patient family history includes No Known Problems in her mother.       Objective   Well appearing patient in no apparent distress; mood and affect are within normal limits.    A full examination was performed including scalp, head, eyes, ears, nose, lips, neck, chest, axillae, abdomen, back, buttocks, bilateral upper extremities, bilateral lower extremities, hands, feet, fingers, toes, fingernails, and toenails. All findings within normal limits unless  otherwise noted below.    Assessment/Plan   1. Scar conditions and fibrosis of skin  Scar is well-healed without evidence of recurrence    This is a benign finding and requires no treatment.      Related Procedures  Follow Up In Dermatology - Established Patient    2. Melanocytic nevus, unspecified location  All nevi were symmetric brown macules without atypia on dermoscopy.     The ABCDEs of melanoma and warning signs of non-melanoma skin cancer were discussed with patient and patient expressed understanding. Sun protection and use of at least SPF 30 discussed with patient. Pt instructed to reapply every 2 hours.     Related Procedures  Follow Up In Dermatology - Established Patient    3. Hemangioma, unspecified site  Scattered cherry-red papule(s).    This is a benign finding and requires no treatment.      4. Lentigo  Scattered tan macules in sun-exposed areas.    The ABCDEs of melanoma and warning signs of non-melanoma skin cancer were discussed with patient and patient expressed understanding. Sun protection and use of at least SPF 30 discussed with patient. Pt instructed to reapply every 2 hours.     5. Inflamed acrochordon  Right Axilla  Skin colored polypoid papule with erythematous base    - this is a benign finding and requires no treatment  - given pruritic nature for pt, courtesy cryotherapy treatment offered and pt amenable  - risks and benefits reviewed including but not limited to pain, redness, swelling, blister, scab, healing with hypo or hyperpigmentation, and scar. Chance of recurrence or persistence reviewed.          Destr of lesion - Right Axilla  Complexity: simple    Destruction method: cryotherapy    Timeout:  patient name, date of birth, surgical site, and procedure verified  Lesion destroyed using liquid nitrogen: Yes    Region frozen until ice ball extended beyond lesion: Yes    Cryotherapy cycles:  1  Outcome: patient tolerated procedure well with no complications    Post-procedure  details: wound care instructions given        F/u in 1yr  Pt seen by Dr. Aminata Estrella, PGY-2    I saw and evaluated the patient. I personally obtained the key and critical portions of the history and physical exam or was physically present for key and critical portions performed by the student/resident. I reviewed the student/resident's documentation and discussed the patient with the student/resident. I was present for the entirety of any procedure(s). I agree with the student/resident's medical decision making as documented in the note.

## 2025-03-26 ENCOUNTER — APPOINTMENT (OUTPATIENT)
Dept: DERMATOLOGY | Facility: CLINIC | Age: 70
End: 2025-03-26
Payer: MEDICARE

## 2025-03-26 DIAGNOSIS — L81.4 LENTIGO: ICD-10-CM

## 2025-03-26 DIAGNOSIS — Z85.828 PERSONAL HISTORY OF SKIN CANCER: ICD-10-CM

## 2025-03-26 DIAGNOSIS — L90.5 SCAR CONDITIONS AND FIBROSIS OF SKIN: Primary | ICD-10-CM

## 2025-03-26 DIAGNOSIS — L91.8 INFLAMED ACROCHORDON: ICD-10-CM

## 2025-03-26 DIAGNOSIS — D22.9 MELANOCYTIC NEVUS, UNSPECIFIED LOCATION: ICD-10-CM

## 2025-03-26 DIAGNOSIS — Z12.83 ENCOUNTER FOR SCREENING FOR MALIGNANT NEOPLASM OF SKIN: ICD-10-CM

## 2025-03-26 DIAGNOSIS — D18.00 HEMANGIOMA, UNSPECIFIED SITE: ICD-10-CM

## 2025-03-26 PROCEDURE — 1123F ACP DISCUSS/DSCN MKR DOCD: CPT | Performed by: DERMATOLOGY

## 2025-03-26 PROCEDURE — 99213 OFFICE O/P EST LOW 20 MIN: CPT | Performed by: DERMATOLOGY

## 2025-03-26 PROCEDURE — 1159F MED LIST DOCD IN RCRD: CPT | Performed by: DERMATOLOGY

## 2025-03-26 PROCEDURE — 1036F TOBACCO NON-USER: CPT | Performed by: DERMATOLOGY

## 2025-06-16 ENCOUNTER — APPOINTMENT (OUTPATIENT)
Dept: DERMATOLOGY | Facility: CLINIC | Age: 70
End: 2025-06-16
Payer: MEDICARE

## 2025-06-16 DIAGNOSIS — Z41.1 ENCOUNTER FOR COSMETIC SURGERY: Primary | ICD-10-CM

## 2025-06-16 PROCEDURE — 1036F TOBACCO NON-USER: CPT | Performed by: DERMATOLOGY

## 2025-06-16 PROCEDURE — 1159F MED LIST DOCD IN RCRD: CPT | Performed by: DERMATOLOGY

## 2025-06-16 PROCEDURE — 17110 DESTRUCTION B9 LES UP TO 14: CPT | Performed by: DERMATOLOGY

## 2025-06-16 NOTE — Clinical Note
Risks, benefits, side effects, alternatives and options were discussed with patient and the patient voiced understanding. ISK x 15 treated cosmetically with LN2 x 3 cycles; wound care d/w pt and pt expressed understanding.

## 2025-06-16 NOTE — PROGRESS NOTES
Subjective     Lila Grewal is a 70 y.o. female who presents for the following: Cosmetic (Cosmetic removal of Benign lesions located on Back and Abdomen. ).         Review of Systems:  No other skin or systemic complaints other than what is documented elsewhere in the note.    The following portions of the chart were reviewed this encounter and updated as appropriate:       Skin Cancer History  Biopsy Log Book  No skin cancers from Specimen Tracking.    Additional History      Specialty Problems          Dermatology Problems    History of nonmelanoma skin cancer    7/20 BCSC removed via MOHs           Past Medical History:  Lila Grewal  has a past medical history of Abnormal screening cardiac CT (11/13/2024), H/O abdominal ultrasound, H/O bone density study, H/O bone density study (09/16/2024), H/O chest x-ray, H/O diagnostic ultrasound (09/23/2023), H/O Doppler ultrasound, H/O magnetic resonance imaging, H/O x-ray of lumbar spine, and History of x-ray of hip.    Past Surgical History:  Lila Grewal  has a past surgical history that includes Other surgical history (01/11/2021); Other surgical history (01/11/2021); Colonoscopy; Total abdominal hysterectomy; Laryngoscopy; Other surgical history (05/15/2019); and Band hemorrhoidectomy (02/29/2024).    Family History:  Patient family history includes No Known Problems in her mother.       Objective   Well appearing patient in no apparent distress; mood and affect are within normal limits.    A focused skin examination was performed. All findings within normal limits unless otherwise noted below.    Assessment/Plan   Skin Exam  1. ENCOUNTER FOR COSMETIC SURGERY (2)  Left Abdomen (side) - Lower, Mid Back  Patient has irritated seborrheic keratoses x 15  Risks, benefits, side effects, alternatives and options were discussed with patient and the patient voiced understanding. ISK x 15 treated cosmetically with LN2 x 3 cycles; wound care d/w pt and pt expressed  understanding.     Follow up as needed.

## 2025-07-23 ENCOUNTER — ANESTHESIA EVENT (OUTPATIENT)
Dept: GASTROENTEROLOGY | Facility: EXTERNAL LOCATION | Age: 70
End: 2025-07-23

## 2025-08-04 ENCOUNTER — APPOINTMENT (OUTPATIENT)
Dept: GASTROENTEROLOGY | Facility: EXTERNAL LOCATION | Age: 70
End: 2025-08-04
Payer: MEDICARE

## 2025-08-04 ENCOUNTER — ANESTHESIA (OUTPATIENT)
Dept: GASTROENTEROLOGY | Facility: EXTERNAL LOCATION | Age: 70
End: 2025-08-04

## 2025-08-04 VITALS
HEIGHT: 66 IN | OXYGEN SATURATION: 98 % | SYSTOLIC BLOOD PRESSURE: 142 MMHG | WEIGHT: 170 LBS | TEMPERATURE: 97.2 F | BODY MASS INDEX: 27.32 KG/M2 | HEART RATE: 59 BPM | DIASTOLIC BLOOD PRESSURE: 68 MMHG | RESPIRATION RATE: 13 BRPM

## 2025-08-04 DIAGNOSIS — Z12.11 SCREENING FOR COLON CANCER: Primary | ICD-10-CM

## 2025-08-04 DIAGNOSIS — K21.9 GASTROESOPHAGEAL REFLUX DISEASE, UNSPECIFIED WHETHER ESOPHAGITIS PRESENT: ICD-10-CM

## 2025-08-04 PROCEDURE — 43235 EGD DIAGNOSTIC BRUSH WASH: CPT | Performed by: INTERNAL MEDICINE

## 2025-08-04 RX ORDER — LIDOCAINE HYDROCHLORIDE 20 MG/ML
INJECTION, SOLUTION EPIDURAL; INFILTRATION; INTRACAUDAL; PERINEURAL AS NEEDED
Status: DISCONTINUED | OUTPATIENT
Start: 2025-08-04 | End: 2025-08-04

## 2025-08-04 RX ORDER — SODIUM CHLORIDE 9 MG/ML
INJECTION, SOLUTION INTRAVENOUS CONTINUOUS PRN
Status: DISCONTINUED | OUTPATIENT
Start: 2025-08-04 | End: 2025-08-04

## 2025-08-04 RX ORDER — PROPOFOL 10 MG/ML
INJECTION, EMULSION INTRAVENOUS AS NEEDED
Status: DISCONTINUED | OUTPATIENT
Start: 2025-08-04 | End: 2025-08-04

## 2025-08-04 RX ADMIN — PROPOFOL 50 MG: 10 INJECTION, EMULSION INTRAVENOUS at 14:05

## 2025-08-04 RX ADMIN — PROPOFOL 50 MG: 10 INJECTION, EMULSION INTRAVENOUS at 13:59

## 2025-08-04 RX ADMIN — PROPOFOL 50 MG: 10 INJECTION, EMULSION INTRAVENOUS at 13:53

## 2025-08-04 RX ADMIN — LIDOCAINE HYDROCHLORIDE 100 MG: 20 INJECTION, SOLUTION EPIDURAL; INFILTRATION; INTRACAUDAL; PERINEURAL at 13:43

## 2025-08-04 RX ADMIN — SODIUM CHLORIDE: 9 INJECTION, SOLUTION INTRAVENOUS at 13:40

## 2025-08-04 RX ADMIN — PROPOFOL 50 MG: 10 INJECTION, EMULSION INTRAVENOUS at 13:48

## 2025-08-04 RX ADMIN — PROPOFOL 150 MG: 10 INJECTION, EMULSION INTRAVENOUS at 13:43

## 2025-08-04 SDOH — HEALTH STABILITY: MENTAL HEALTH: CURRENT SMOKER: 0

## 2025-08-04 ASSESSMENT — PAIN SCALES - GENERAL
PAIN_LEVEL: 0
PAINLEVEL_OUTOF10: 0 - NO PAIN

## 2025-08-04 ASSESSMENT — COLUMBIA-SUICIDE SEVERITY RATING SCALE - C-SSRS
1. IN THE PAST MONTH, HAVE YOU WISHED YOU WERE DEAD OR WISHED YOU COULD GO TO SLEEP AND NOT WAKE UP?: NO
6. HAVE YOU EVER DONE ANYTHING, STARTED TO DO ANYTHING, OR PREPARED TO DO ANYTHING TO END YOUR LIFE?: NO
2. HAVE YOU ACTUALLY HAD ANY THOUGHTS OF KILLING YOURSELF?: NO

## 2025-08-04 ASSESSMENT — PAIN - FUNCTIONAL ASSESSMENT
PAIN_FUNCTIONAL_ASSESSMENT: 0-10

## 2025-08-04 NOTE — ANESTHESIA POSTPROCEDURE EVALUATION
Patient: Lila Grewal    Procedure Summary       Date: 08/04/25 Room / Location: [facility]    Anesthesia Start: 1340 Anesthesia Stop:     Procedures:       COLONOSCOPY      EGD Diagnosis:       Screening for colon cancer      Gastroesophageal reflux disease, unspecified whether esophagitis present    Scheduled Providers: Marco Antonio Hooper MD Responsible Provider: YUE Ceja    Anesthesia Type: MAC ASA Status: 2            Anesthesia Type: MAC    Vitals Value Taken Time   /70 08/04/25 14:20   Temp 36.2 °C (97.2 °F) 08/04/25 14:20   Pulse 65 08/04/25 14:20   Resp 14 08/04/25 14:20   SpO2 99 % 08/04/25 14:20       Anesthesia Post Evaluation    Patient location during evaluation: bedside  Patient participation: complete - patient cannot participate  Level of consciousness: awake and responsive to verbal stimuli  Pain score: 0  Pain management: adequate  Airway patency: patent  Cardiovascular status: acceptable and hemodynamically stable  Respiratory status: acceptable  Hydration status: acceptable  Postoperative Nausea and Vomiting: none        No notable events documented.

## 2025-08-04 NOTE — DISCHARGE INSTRUCTIONS

## 2025-08-04 NOTE — H&P
Outpatient NR Procedure H&P    Patient Profile  Name Lila Grewal  Date of Birth 1955  MRN [id number]  PCP Oneyda Sims        Diagnosis: gerd,screen  Procedure(s):  EGD/Colonoscopy       Allergies  RX Allergies[1]    Past Medical History   Medical History[2]    Medication Reviewed - yes  Prior to Admission medications   Medication Sig Start Date End Date Taking? Authorizing Provider   bimatoprost (Latisse) 0.03 % ophthalmic solution Administer into both eyes once daily at bedtime. 10/31/23  Yes Historical Provider, MD   cyanocobalamin (Vitamin B-12) 1,000 mcg tablet Take 1 tablet (1,000 mcg) by mouth once daily. 5/16/19  Yes Historical Provider, MD   vitamin D3-vitamin K2, MK4, (K2 Plus D3) 1,000-100 unit-mcg tablet Take 1 tablet by mouth once daily. 6/14/21  Yes Historical Provider, MD   Nystatin/Doxycycline/Hydrocortisone/Diphenhydramine Oral Susp Swish and spit 10 mL 3 times a day. Shake well. Swish, gargle and spit. 2/20/25 8/4/25  Oneyda Sims MD       Physical Exam  Vitals:    08/04/25 1219   BP: 154/68   Pulse: 87   Resp: 15   Temp: 36.3 °C (97.3 °F)   SpO2: 99%      Weight   Vitals:    08/04/25 1219   Weight: 77.1 kg (170 lb)     BMI Body mass index is 27.86 kg/m².    General: A&Ox3, NAD.  HEENT: AT/NC.   CV: RRR. No murmur.  Resp: CTA bilaterally. No wheezing, rhonchi or rales.   GI: Soft, NT/ND. BSx4.  Extrem: No edema. Pulses intact.  Skin: No Jaundice.   Neuro: No focal deficits.   Psych: Normal mood and affect.        Oropharyngeal Classification I (soft palate, uvula, fauces, and tonsillar pillars visible)  ASA PS Classification 2  Sedation Plan: Deep Sedation.  Procedure Plan - pre-procedural (re)assesment completed by physician:  discharge/transfer patient when discharge criteria met    Marco Antonio Hooper MD  8/4/2025 1:43 PM          [1] No Known Allergies  [2]   Past Medical History:  Diagnosis Date    Abnormal screening cardiac CT 11/13/2024    Coronary artery calcium score of 1.3 (LM)     GERD (gastroesophageal reflux disease) Sporadically since 2015    H/O abdominal ultrasound     2018: HEPATIC CYSTS, THE ONE IN THE LEFT HEPATIC LOBE IS MILDLY COMPLEX.     Anechoic cyst in the left hepatic lobe measures 1.3 x 0.9      x 0.8 cm and contains a thin internal septation. Additional anechoic      cyst in the right lobe measures 4 x 6 x 6 mm    H/O bone density study     9/19/22: Ten Year Major Osteoporotic Fracture Risk: 8.67%      Ten Year Hip Fracture Risk: 0.91%      T-Score: -1.5      2013: -1.1, FRAX 5.2/0.3%; -1.2     6/20: Ten Year Major Osteoporotic Fracture Risk: 9.39%      Ten Year Hip Fracture Risk: 1.05%      T-Score: -1.7    H/O bone density study 09/16/2024    -1.6 L femur. FRAX 9.5/1.2    H/O chest x-ray     1/21: BONES:     Lower thoracic mild dextrocurvature may be partially exaggerated by     positioning. Mild multilevel anterior endplate spurring present in     the spine.          IMPRESSION:     1. Hyperinflation. Mild diffuse interstitial prominence which may be chronic. No localized     consolidation.    H/O diagnostic ultrasound 09/23/2023    R Breast US Benign fibrocystic changes in upper outer quadrant. No evidence of malignancy. 1 yr screening rec'd    H/O Doppler ultrasound     4/21: US aorta (-)    H/O magnetic resonance imaging     6/2019: MRI HIP: MILD RIGHT HIP OSTEOARTHRITIS.     BILATERAL GREATER TROCHANTERIC BURSITIS, RIGHT GREATER THAN LEFT.     CALCIFIC TENDINOSIS LEFT HAMSTRING ORIGIN.      Right hip: Increased signal within the anterior superior labrum with          small tear and small subchondral cystic changes in the superior      acetabulum. Small areas of partial-thickness cartilage loss.    H/O x-ray of lumbar spine     2011: DEGENERATIVE DISK DISEASE    Hernia, internal 2001 following hysterectomy surgery    History of x-ray of hip     1/19: RESULT:          Right hip joint is maintained. Mild hypertrophic changes at the greater      trochanter. Left hip joint,  sacroiliac joints and pubic symphysis are      also maintained. Calcified deposit at left hamstring origin has the      appearance of calcific tendinitis. No acute fracture or dislocation.      There are no bony erosions.

## 2025-08-04 NOTE — ANESTHESIA PREPROCEDURE EVALUATION
Patient: Lila Grewal    Procedure Information       Date/Time: 08/04/25 1230    Scheduled providers: Marco Antonio Hooper MD    Procedures:       COLONOSCOPY      EGD    Location: [facility]            Relevant Problems   Cardiac   (+) White coat syndrome without diagnosis of hypertension      GI   (+) Gastroesophageal reflux disease   (+) Hiatal hernia      Liver   (+) Liver cyst       Clinical information reviewed:   Tobacco  Allergies  Meds   Med Hx  Surg Hx   Fam Hx  Soc Hx        NPO Detail:  NPO/Void Status  Carbohydrate Drink Given Prior to Surgery? : N  Date of Last Liquid: 08/04/25  Time of Last Liquid: 0800  Date of Last Solid: 08/02/25  Time of Last Solid: 2100  Last Intake Type: Clear fluids  Time of Last Void: 1214         Physical Exam    Airway  Mallampati: II  TM distance: >3 FB  Neck ROM: full     Cardiovascular - normal exam   Dental - normal exam     Pulmonary - normal examBreath sounds clear to auscultation     Abdominal            Anesthesia Plan    History of general anesthesia?: yes  History of complications of general anesthesia?: no    ASA 2     MAC     The patient is not a current smoker.    intravenous induction   Anesthetic plan and risks discussed with patient.    Plan discussed with CRNA.

## 2025-08-04 NOTE — H&P
Outpatient NR Procedure H&P    Patient Profile  Name Lila Grewal  Date of Birth 1955  MRN [id number]  PCP Oneyda Sims        Diagnosis: gerd,screen  Procedure(s):  EGD/Colonoscopy       Allergies  RX Allergies[1]    Past Medical History   Medical History[2]    Medication Reviewed - yes  Prior to Admission medications   Medication Sig Start Date End Date Taking? Authorizing Provider   bimatoprost (Latisse) 0.03 % ophthalmic solution Administer into both eyes once daily at bedtime. 10/31/23  Yes Historical Provider, MD   cyanocobalamin (Vitamin B-12) 1,000 mcg tablet Take 1 tablet (1,000 mcg) by mouth once daily. 5/16/19  Yes Historical Provider, MD   vitamin D3-vitamin K2, MK4, (K2 Plus D3) 1,000-100 unit-mcg tablet Take 1 tablet by mouth once daily. 6/14/21  Yes Historical Provider, MD   Nystatin/Doxycycline/Hydrocortisone/Diphenhydramine Oral Susp Swish and spit 10 mL 3 times a day. Shake well. Swish, gargle and spit. 2/20/25 8/4/25  Oneyda Sims MD       Physical Exam  Vitals:    08/04/25 1420   BP: 128/70   Pulse: 65   Resp: 14   Temp: 36.2 °C (97.2 °F)   SpO2: 99%      Weight   Vitals:    08/04/25 1219   Weight: 77.1 kg (170 lb)     BMI Body mass index is 27.86 kg/m².    General: A&Ox3, NAD.  HEENT: AT/NC.   CV: RRR. No murmur.  Resp: CTA bilaterally. No wheezing, rhonchi or rales.   GI: Soft, NT/ND. BSx4.  Extrem: No edema. Pulses intact.  Skin: No Jaundice.   Neuro: No focal deficits.   Psych: Normal mood and affect.        Oropharyngeal Classification I (soft palate, uvula, fauces, and tonsillar pillars visible)  ASA PS Classification 2  Sedation Plan: Deep Sedation.  Procedure Plan - pre-procedural (re)assesment completed by physician:  discharge/transfer patient when discharge criteria met    Marco Antonio Hooper MD  8/4/2025 2:26 PM          [1] No Known Allergies  [2]   Past Medical History:  Diagnosis Date    Abnormal screening cardiac CT 11/13/2024    Coronary artery calcium score of 1.3 (LM)     GERD (gastroesophageal reflux disease) Sporadically since 2015    H/O abdominal ultrasound     2018: HEPATIC CYSTS, THE ONE IN THE LEFT HEPATIC LOBE IS MILDLY COMPLEX.     Anechoic cyst in the left hepatic lobe measures 1.3 x 0.9      x 0.8 cm and contains a thin internal septation. Additional anechoic      cyst in the right lobe measures 4 x 6 x 6 mm    H/O bone density study     9/19/22: Ten Year Major Osteoporotic Fracture Risk: 8.67%      Ten Year Hip Fracture Risk: 0.91%      T-Score: -1.5      2013: -1.1, FRAX 5.2/0.3%; -1.2     6/20: Ten Year Major Osteoporotic Fracture Risk: 9.39%      Ten Year Hip Fracture Risk: 1.05%      T-Score: -1.7    H/O bone density study 09/16/2024    -1.6 L femur. FRAX 9.5/1.2    H/O chest x-ray     1/21: BONES:     Lower thoracic mild dextrocurvature may be partially exaggerated by     positioning. Mild multilevel anterior endplate spurring present in     the spine.          IMPRESSION:     1. Hyperinflation. Mild diffuse interstitial prominence which may be chronic. No localized     consolidation.    H/O diagnostic ultrasound 09/23/2023    R Breast US Benign fibrocystic changes in upper outer quadrant. No evidence of malignancy. 1 yr screening rec'd    H/O Doppler ultrasound     4/21: US aorta (-)    H/O magnetic resonance imaging     6/2019: MRI HIP: MILD RIGHT HIP OSTEOARTHRITIS.     BILATERAL GREATER TROCHANTERIC BURSITIS, RIGHT GREATER THAN LEFT.     CALCIFIC TENDINOSIS LEFT HAMSTRING ORIGIN.      Right hip: Increased signal within the anterior superior labrum with          small tear and small subchondral cystic changes in the superior      acetabulum. Small areas of partial-thickness cartilage loss.    H/O x-ray of lumbar spine     2011: DEGENERATIVE DISK DISEASE    Hernia, internal 2001 following hysterectomy surgery    History of x-ray of hip     1/19: RESULT:          Right hip joint is maintained. Mild hypertrophic changes at the greater      trochanter. Left hip joint,  sacroiliac joints and pubic symphysis are      also maintained. Calcified deposit at left hamstring origin has the      appearance of calcific tendinitis. No acute fracture or dislocation.      There are no bony erosions.

## 2025-08-12 ENCOUNTER — RESULTS FOLLOW-UP (OUTPATIENT)
Dept: GASTROENTEROLOGY | Facility: EXTERNAL LOCATION | Age: 70
End: 2025-08-12
Payer: MEDICARE

## 2025-08-12 PROBLEM — Z86.0100 HISTORY OF COLON POLYPS: Status: ACTIVE | Noted: 2025-08-12

## 2025-08-12 LAB
LABORATORY COMMENT REPORT: NORMAL
PATH REPORT.FINAL DX SPEC: NORMAL
PATH REPORT.GROSS SPEC: NORMAL
PATH REPORT.TOTAL CANCER: NORMAL

## 2026-03-19 ENCOUNTER — APPOINTMENT (OUTPATIENT)
Dept: PRIMARY CARE | Facility: CLINIC | Age: 71
End: 2026-03-19
Payer: MEDICARE

## 2026-04-08 ENCOUNTER — APPOINTMENT (OUTPATIENT)
Dept: DERMATOLOGY | Facility: CLINIC | Age: 71
End: 2026-04-08
Payer: MEDICARE